# Patient Record
Sex: MALE | Race: BLACK OR AFRICAN AMERICAN
[De-identification: names, ages, dates, MRNs, and addresses within clinical notes are randomized per-mention and may not be internally consistent; named-entity substitution may affect disease eponyms.]

---

## 2018-05-07 ENCOUNTER — HOSPITAL ENCOUNTER (EMERGENCY)
Dept: HOSPITAL 89 - ER | Age: 33
Discharge: HOME | End: 2018-05-07
Payer: SELF-PAY

## 2018-05-07 VITALS — SYSTOLIC BLOOD PRESSURE: 151 MMHG | DIASTOLIC BLOOD PRESSURE: 91 MMHG

## 2018-05-07 DIAGNOSIS — F10.920: Primary | ICD-10-CM

## 2018-05-07 DIAGNOSIS — I10: ICD-10-CM

## 2018-05-07 DIAGNOSIS — E87.6: ICD-10-CM

## 2018-05-07 LAB — PLATELET COUNT, AUTOMATED: 177 K/UL (ref 150–450)

## 2018-05-07 PROCEDURE — 84295 ASSAY OF SERUM SODIUM: CPT

## 2018-05-07 PROCEDURE — 84460 ALANINE AMINO (ALT) (SGPT): CPT

## 2018-05-07 PROCEDURE — 82565 ASSAY OF CREATININE: CPT

## 2018-05-07 PROCEDURE — 83690 ASSAY OF LIPASE: CPT

## 2018-05-07 PROCEDURE — 82040 ASSAY OF SERUM ALBUMIN: CPT

## 2018-05-07 PROCEDURE — 82374 ASSAY BLOOD CARBON DIOXIDE: CPT

## 2018-05-07 PROCEDURE — 99283 EMERGENCY DEPT VISIT LOW MDM: CPT

## 2018-05-07 PROCEDURE — 36415 COLL VENOUS BLD VENIPUNCTURE: CPT

## 2018-05-07 PROCEDURE — 81001 URINALYSIS AUTO W/SCOPE: CPT

## 2018-05-07 PROCEDURE — 80320 DRUG SCREEN QUANTALCOHOLS: CPT

## 2018-05-07 PROCEDURE — 82150 ASSAY OF AMYLASE: CPT

## 2018-05-07 PROCEDURE — 82435 ASSAY OF BLOOD CHLORIDE: CPT

## 2018-05-07 PROCEDURE — 84450 TRANSFERASE (AST) (SGOT): CPT

## 2018-05-07 PROCEDURE — 80305 DRUG TEST PRSMV DIR OPT OBS: CPT

## 2018-05-07 PROCEDURE — 84443 ASSAY THYROID STIM HORMONE: CPT

## 2018-05-07 PROCEDURE — 82247 BILIRUBIN TOTAL: CPT

## 2018-05-07 PROCEDURE — 84132 ASSAY OF SERUM POTASSIUM: CPT

## 2018-05-07 PROCEDURE — 82310 ASSAY OF CALCIUM: CPT

## 2018-05-07 PROCEDURE — 83735 ASSAY OF MAGNESIUM: CPT

## 2018-05-07 PROCEDURE — 82947 ASSAY GLUCOSE BLOOD QUANT: CPT

## 2018-05-07 PROCEDURE — 84075 ASSAY ALKALINE PHOSPHATASE: CPT

## 2018-05-07 PROCEDURE — 84520 ASSAY OF UREA NITROGEN: CPT

## 2018-05-07 PROCEDURE — 80329 ANALGESICS NON-OPIOID 1 OR 2: CPT

## 2018-05-07 PROCEDURE — 84155 ASSAY OF PROTEIN SERUM: CPT

## 2018-05-07 PROCEDURE — 85025 COMPLETE CBC W/AUTO DIFF WBC: CPT

## 2018-05-07 NOTE — ER REPORT
History and Physical


Time Seen By MD:  19:21


Hx. of Stated Complaint:  


"I HAVE A ALCOHOL PROBLEM AND FEEL LIKE MY LIVER IS GOING TO DIE" "I NEED HELP"


HPI/ROS


CHIEF COMPLAINT: Alcohol detox





HISTORY OF PRESENT ILLNESS: 32-year-old male patient presents to emergency room 

with complaint of wanting to detox from alcohol. Patient states he drinks 

significantly every day and has for the past 1617 years. Patient states he 

typically will drink to 40s of beer as well as 12 shots of whiskey. Patient 

denies having any nausea, vomiting with this. Patient states that he has seen a 

primary care provider who did check labs. He was told by them that his liver is 

already damaged by this and he needs to quit drinking before his liver is 

permanently damage. Patient states that he has been having some abdominal pain, 

especially in the right upper and left upper walk since. Patient states that he 

was prescribed a medication for hypertension but has not taken it.





REVIEW OF SYSTEMS:


Respiratory: No cough, no dyspnea.


Cardiovascular: No chest pain, no palpitations.


Gastrointestinal: As noted above


Musculoskeletal: No back pain.


Allergies:  


Coded Allergies:  


     ibuprofen (Verified  Allergy, Intermediate, HIVES, 10/5/16)


     hydrocodone (Verified  Adverse Reaction, Unknown, NAUSEA/VOMITING, 10/5/16)


     tramadol (Verified  Adverse Reaction, Unknown, NAUSEA/VOMITING, 10/5/16)


Home Meds


Discontinued Scripts


Oxycodone Hcl/Acetaminophen (OXYCODONE-ACETAMINOPHEN 5-325) 1 Each Tablet, 1-2 

TAB PO Q6H Y for PAIN, #60 TAB 0 Refills


   Prov:[facility], [facility]         10/28/16


Cyclobenzaprine Hcl (CYCLOBENZAPRINE HCL) 10 Mg Tablet, 10 MG PO TID for Muscle 

Relaxant, #30 TAB


   Prov:GARY NEWMAN DNP, FNP-BC         10/28/16


Gabapentin (GABAPENTIN) 600 Mg Tablet, 1 TAB PO BID, #60 TAB 1 Refill


   Prov:GARY NEWMAN DNP [facility]         10/28/16


Past Medical/Surgical History


Patient has a past medical history of hypertension, back pain, marijuana and 

exceeded abuse in the past, alcohol use.


Patient has no pertinent surgical history.


Reviewed Nurses Notes:  Yes


Hx Smoking:  Yes


Smoking Status:  Current: Every Day Smoker


Exposure to Second Hand Smoke?:  No


Hx Substance Use Disorder:  Yes (MARIJUANNA, AND ECSTASY IN THE PAST)


Hx Alcohol Use:  Yes (FREQUENTLY, ALMOST EVERY DAY)


Constitutional





Vital Sign - Last 24 Hours








 5/7/18





 19:14


 


Temp 98.4


 


Pulse 90


 


Resp 14


 


B/P (MAP) 160/107


 


Pulse Ox 95


 


O2 Delivery Room Air








Physical Exam


  General Appearance: The patient is alert, has no immediate need for airway 

protection and no current signs of toxicity.


ENT: Tympanic membranes are pearly-gray, auditory canals are patent, mucous 

membranes are moist.


Respiratory: Chest is non tender, lungs are clear to auscultation.


Cardiac: regular rate and rhythm


Gastrointestinal: Abdomen is soft and mildly tender in the bilateral upper 

quadrants, no masses, bowel sounds normal.


Musculoskeletal:  Neck: Neck is supple and non tender.


   Extremities have full range of motion and are non tender.


Skin: No rashes or lesions.





DIFFERENTIAL DIAGNOSIS: After history and physical exam differential diagnosis 

was considered for alcohol abuse, depression, intoxication.





Medical Decision Making


Data Points


Result Diagram:  


5/7/18 1920 5/7/18 1920





Laboratory





Hematology








Test


  5/7/18


19:20 5/7/18


19:35


 


Red Blood Count


  5.44 M/uL


(4.00-5.60) 


 


 


Mean Corpuscular Volume


  88.6 fL


(80.0-96.0) 


 


 


Mean Corpuscular Hemoglobin


  30.4 pg


(26.0-33.0) 


 


 


Mean Corpuscular Hemoglobin


Concent 34.3 g/dL


(32.0-36.0) 


 


 


Red Cell Distribution Width


  13.9 %


(11.5-14.5) 


 


 


Mean Platelet Volume


  8.2 fL


(7.2-11.1) 


 


 


Neutrophils (%) (Auto)


  48.2 %


(39.4-72.5) 


 


 


Lymphocytes (%) (Auto)


  35.1 %


(17.6-49.6) 


 


 


Monocytes (%) (Auto)


  14.5 %


(4.1-12.4) 


 


 


Eosinophils (%) (Auto)


  1.4 %


(0.4-6.7) 


 


 


Basophils (%) (Auto)


  0.8 %


(0.3-1.4) 


 


 


Nucleated RBC Relative Count


(auto) 0.1 /100WBC 


  


 


 


Neutrophils # (Auto)


  3.3 K/uL


(2.0-7.4) 


 


 


Lymphocytes # (Auto)


  2.4 K/uL


(1.3-3.6) 


 


 


Monocytes # (Auto)


  1.0 K/uL


(0.3-1.0) 


 


 


Eosinophils # (Auto)


  0.1 K/uL


(0.0-0.5) 


 


 


Basophils # (Auto)


  0.1 K/uL


(0.0-0.1) 


 


 


Nucleated RBC Absolute Count


(auto) 0.00 K/uL 


  


 


 


Sodium Level


  143 mmol/L


(137-145) 


 


 


Potassium Level


  2.9 mmol/L


(3.5-5.0) 


 


 


Chloride Level


  106 mmol/L


() 


 


 


Carbon Dioxide Level


  21 mmol/L


(22-30) 


 


 


Blood Urea Nitrogen 6 mg/dl (9-21)  


 


Creatinine


  0.80 mg/dl


(0.66-1.25) 


 


 


Glomerular Filtration Rate


Calc > 60.0 


  


 


 


Random Glucose


  94 mg/dl


() 


 


 


Calcium Level


  9.5 mg/dl


(8.4-10.2) 


 


 


Magnesium Level


  1.8 mg/dl


(1.7-2.2) 


 


 


Total Bilirubin


  0.6 mg/dl


(0.2-1.3) 


 


 


Aspartate Amino Transf


(AST/SGOT) 169 U/L (0-35) 


  


 


 


Alanine Aminotransferase


(ALT/SGPT) 107 U/L (0-56) 


  


 


 


Alkaline Phosphatase 86 U/L (0-126)  


 


Total Protein


  7.4 gm/dl


(6.3-8.2) 


 


 


Albumin


  4.1 g/dl


(3.5-5.0) 


 


 


Amylase Level 95 U/L (0-110)  


 


Lipase


  298 U/L


() 


 


 


Salicylates Level < 10 mg/L  


 


Salicylate Last Dose Date unk  


 


Acetaminophen Level < 10 ug/ml  


 


Serum Alcohol 254 mg/dl  


 


Urine Color  Straw 


 


Urine Clarity  Clear 


 


Urine pH


  


  6.0 pH


(4.8-9.5)


 


Urine Specific Gravity  1.004 


 


Urine Protein


  


  Negative mg/dL


(NEGATIVE)


 


Urine Glucose (UA)


  


  Negative mg/dL


(NEGATIVE)


 


Urine Ketones


  


  Negative mg/dL


(NEGATIVE)


 


Urine Blood


  


  Negative


(NEGATIVE)


 


Urine Nitrite


  


  Negative


(NEGATIVE)


 


Urine Bilirubin


  


  Negative


(NEGATIVE)


 


Urine Urobilinogen


  


  4.0 mg/dL


(0.2-1.9)


 


Urine Leukocyte Esterase


  


  Negative


(NEGATIVE)


 


Urine RBC


  


  <1 /HPF


(0-2/HPF)


 


Urine WBC


  


  None /HPF


(0-5/HPF)


 


Urine Squamous Epithelial


Cells 


  None /LPF


(</=FEW)


 


Urine Bacteria


  


  Negative /HPF


(NONE-FEW)


 


Urine Mucus


  


  None /HPF


(NONE-FEW)


 


Urine Opiates Screen  Negative 


 


Urine Barbiturates Screen  Negative 


 


Ur Tricyclic Antidepressants


Screen 


  Negative 


 


 


Urine Phencyclidine Screen  Negative 


 


Urine Amphetamines Screen  Negative 


 


Urine Benzodiazepines Screen  Negative 


 


Urine Cocaine Screen  Negative 


 


Urine Cannabinoids Screen  Negative 








Chemistry








Test


  5/7/18


19:20 5/7/18


19:35


 


White Blood Count


  6.9 k/uL


(4.5-11.0) 


 


 


Red Blood Count


  5.44 M/uL


(4.00-5.60) 


 


 


Hemoglobin


  16.6 g/dL


(14.0-18.0) 


 


 


Hematocrit


  48.2 %


(42.0-52.0) 


 


 


Mean Corpuscular Volume


  88.6 fL


(80.0-96.0) 


 


 


Mean Corpuscular Hemoglobin


  30.4 pg


(26.0-33.0) 


 


 


Mean Corpuscular Hemoglobin


Concent 34.3 g/dL


(32.0-36.0) 


 


 


Red Cell Distribution Width


  13.9 %


(11.5-14.5) 


 


 


Platelet Count


  177 K/uL


(150-450) 


 


 


Mean Platelet Volume


  8.2 fL


(7.2-11.1) 


 


 


Neutrophils (%) (Auto)


  48.2 %


(39.4-72.5) 


 


 


Lymphocytes (%) (Auto)


  35.1 %


(17.6-49.6) 


 


 


Monocytes (%) (Auto)


  14.5 %


(4.1-12.4) 


 


 


Eosinophils (%) (Auto)


  1.4 %


(0.4-6.7) 


 


 


Basophils (%) (Auto)


  0.8 %


(0.3-1.4) 


 


 


Nucleated RBC Relative Count


(auto) 0.1 /100WBC 


  


 


 


Neutrophils # (Auto)


  3.3 K/uL


(2.0-7.4) 


 


 


Lymphocytes # (Auto)


  2.4 K/uL


(1.3-3.6) 


 


 


Monocytes # (Auto)


  1.0 K/uL


(0.3-1.0) 


 


 


Eosinophils # (Auto)


  0.1 K/uL


(0.0-0.5) 


 


 


Basophils # (Auto)


  0.1 K/uL


(0.0-0.1) 


 


 


Nucleated RBC Absolute Count


(auto) 0.00 K/uL 


  


 


 


Glomerular Filtration Rate


Calc > 60.0 


  


 


 


Calcium Level


  9.5 mg/dl


(8.4-10.2) 


 


 


Magnesium Level


  1.8 mg/dl


(1.7-2.2) 


 


 


Total Bilirubin


  0.6 mg/dl


(0.2-1.3) 


 


 


Aspartate Amino Transf


(AST/SGOT) 169 U/L (0-35) 


  


 


 


Alanine Aminotransferase


(ALT/SGPT) 107 U/L (0-56) 


  


 


 


Alkaline Phosphatase 86 U/L (0-126)  


 


Total Protein


  7.4 gm/dl


(6.3-8.2) 


 


 


Albumin


  4.1 g/dl


(3.5-5.0) 


 


 


Amylase Level 95 U/L (0-110)  


 


Lipase


  298 U/L


() 


 


 


Salicylates Level < 10 mg/L  


 


Salicylate Last Dose Date unk  


 


Acetaminophen Level < 10 ug/ml  


 


Serum Alcohol 254 mg/dl  


 


Urine Color  Straw 


 


Urine Clarity  Clear 


 


Urine pH


  


  6.0 pH


(4.8-9.5)


 


Urine Specific Gravity  1.004 


 


Urine Protein


  


  Negative mg/dL


(NEGATIVE)


 


Urine Glucose (UA)


  


  Negative mg/dL


(NEGATIVE)


 


Urine Ketones


  


  Negative mg/dL


(NEGATIVE)


 


Urine Blood


  


  Negative


(NEGATIVE)


 


Urine Nitrite


  


  Negative


(NEGATIVE)


 


Urine Bilirubin


  


  Negative


(NEGATIVE)


 


Urine Urobilinogen


  


  4.0 mg/dL


(0.2-1.9)


 


Urine Leukocyte Esterase


  


  Negative


(NEGATIVE)


 


Urine RBC


  


  <1 /HPF


(0-2/HPF)


 


Urine WBC


  


  None /HPF


(0-5/HPF)


 


Urine Squamous Epithelial


Cells 


  None /LPF


(</=FEW)


 


Urine Bacteria


  


  Negative /HPF


(NONE-FEW)


 


Urine Mucus


  


  None /HPF


(NONE-FEW)


 


Urine Opiates Screen  Negative 


 


Urine Barbiturates Screen  Negative 


 


Ur Tricyclic Antidepressants


Screen 


  Negative 


 


 


Urine Phencyclidine Screen  Negative 


 


Urine Amphetamines Screen  Negative 


 


Urine Benzodiazepines Screen  Negative 


 


Urine Cocaine Screen  Negative 


 


Urine Cannabinoids Screen  Negative 








Toxicology








Test


  5/7/18


19:20 5/7/18


19:35


 


Salicylates Level < 10 mg/L  


 


Salicylate Last Dose Date unk  


 


Acetaminophen Level < 10 ug/ml  


 


Serum Alcohol 254 mg/dl  


 


Urine Opiates Screen  Negative 


 


Urine Barbiturates Screen  Negative 


 


Ur Tricyclic Antidepressants


Screen 


  Negative 


 


 


Urine Phencyclidine Screen  Negative 


 


Urine Amphetamines Screen  Negative 


 


Urine Benzodiazepines Screen  Negative 


 


Urine Cocaine Screen  Negative 


 


Urine Cannabinoids Screen  Negative 








Urinalysis








Test


  5/7/18


19:35


 


Urine Color Straw 


 


Urine Clarity Clear 


 


Urine pH


  6.0 pH


(4.8-9.5)


 


Urine Specific Gravity 1.004 


 


Urine Protein


  Negative mg/dL


(NEGATIVE)


 


Urine Glucose (UA)


  Negative mg/dL


(NEGATIVE)


 


Urine Ketones


  Negative mg/dL


(NEGATIVE)


 


Urine Blood


  Negative


(NEGATIVE)


 


Urine Nitrite


  Negative


(NEGATIVE)


 


Urine Bilirubin


  Negative


(NEGATIVE)


 


Urine Urobilinogen


  4.0 mg/dL


(0.2-1.9)


 


Urine Leukocyte Esterase


  Negative


(NEGATIVE)


 


Urine RBC


  <1 /HPF


(0-2/HPF)


 


Urine WBC


  None /HPF


(0-5/HPF)


 


Urine Squamous Epithelial


Cells None /LPF


(</=FEW)


 


Urine Bacteria


  Negative /HPF


(NONE-FEW)


 


Urine Mucus


  None /HPF


(NONE-FEW)











ED Course/Re-evaluation


ED Course


Patient was admitted exam room, history and physical were obtained. 

Differential diagnoses were considered. On examination patient is intoxicated, 

does have slightly slurred speech, does smell of alcohol. Patient initially 

states that he would like to be admitted to behavioral health. The lab work for 

a behavioral [facility] admission was ordered. Results came back he did have a low 

potassium of 2.9. He received a potassium 20 mEq tablet here in the emergency 

room. Patient did discuss with the psych tech from the behavioral health unit. 

He was bucking at signing him. He states that he was not ready to commit to 

being put into a locked unit. He states it is very similar to when he had his 

DUI. He states he is struggling with the breakup with his significant other. He 

is unsure. I did give him some additional time to think about whether he wanted 

to be admitted one to go home. He ultimately decided that he would like to go 

home. We'll go ahead and treat him with potassium for his low potassium. I 

would like him follow-up with his primary care provider the next 3-5 days for 

further evaluation and repeat that test. Discussed this with the patient who 

verbalized understanding and agreement with plan.


Decision to Disposition Date:  May 7, 2018


Decision to Disposition Time:  20:58





Depart


Departure


Latest Vital Signs





Vital Signs








  Date Time  Temp Pulse Resp B/P (MAP) Pulse Ox O2 Delivery O2 Flow Rate FiO2


 


5/7/18 19:14 98.4 90 14 160/107 95 Room Air  








Impression:  


 Primary Impression:  


 Alcohol intoxication


 Additional Impression:  


 Hypokalemia


Condition:  Condition Unchanged


Disposition:  HOME OR SELF-CARE


Referrals:  


GARY NEWMAN DNP, FNP-BC (PCP)


New Scripts


Potassium Chloride (KLOR-CON M20) 20 Meq Tab.er.prt


20 MEQ PO QDAY, #30 TAB.SR.[id number]


   Prov: RACHEL EDWARD         5/7/18


Patient Instructions:  Alcohol Intoxication (ED)





Additional Instructions:  


Continue with normal diet.


Avoid alcohol.


Return to the ER if condition worsens.


Take medication as prescribed.


Follow up with your primary care provider in the next 1-2 weeks.


Get plenty of rest.


You may follow up with [facility] for assistance with alcohol withdrawal, ie 

counseling.





Problem Qualifiers








 Primary Impression:  


 Alcohol intoxication


 Complication of substance-induced condition:  uncomplicated  Qualified Codes:  

F10.920 - Alcohol use, unspecified with intoxication, uncomplicated








RACHEL EDWARD May 7, 2018 19:21

## 2018-06-27 ENCOUNTER — HOSPITAL ENCOUNTER (EMERGENCY)
Dept: HOSPITAL 89 - ER | Age: 33
Discharge: HOME | End: 2018-06-27
Payer: SELF-PAY

## 2018-06-27 VITALS — SYSTOLIC BLOOD PRESSURE: 146 MMHG | DIASTOLIC BLOOD PRESSURE: 122 MMHG

## 2018-06-27 DIAGNOSIS — I10: ICD-10-CM

## 2018-06-27 DIAGNOSIS — F41.9: ICD-10-CM
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
## 2018-06-27 DIAGNOSIS — F10.129: Primary | ICD-10-CM

## 2018-06-27 LAB — PLATELET COUNT, AUTOMATED: 189 K/UL (ref 150–450)

## 2018-06-27 PROCEDURE — 85379 FIBRIN DEGRADATION QUANT: CPT

## 2018-06-27 PROCEDURE — 84155 ASSAY OF PROTEIN SERUM: CPT

## 2018-06-27 PROCEDURE — 82565 ASSAY OF CREATININE: CPT

## 2018-06-27 PROCEDURE — 84132 ASSAY OF SERUM POTASSIUM: CPT

## 2018-06-27 PROCEDURE — 71046 X-RAY EXAM CHEST 2 VIEWS: CPT

## 2018-06-27 PROCEDURE — 84484 ASSAY OF TROPONIN QUANT: CPT

## 2018-06-27 PROCEDURE — 80320 DRUG SCREEN QUANTALCOHOLS: CPT

## 2018-06-27 PROCEDURE — 82040 ASSAY OF SERUM ALBUMIN: CPT

## 2018-06-27 PROCEDURE — 93005 ELECTROCARDIOGRAM TRACING: CPT

## 2018-06-27 PROCEDURE — 84295 ASSAY OF SERUM SODIUM: CPT

## 2018-06-27 PROCEDURE — 99284 EMERGENCY DEPT VISIT MOD MDM: CPT

## 2018-06-27 PROCEDURE — 82247 BILIRUBIN TOTAL: CPT

## 2018-06-27 PROCEDURE — 82310 ASSAY OF CALCIUM: CPT

## 2018-06-27 PROCEDURE — 85025 COMPLETE CBC W/AUTO DIFF WBC: CPT

## 2018-06-27 PROCEDURE — 84450 TRANSFERASE (AST) (SGOT): CPT

## 2018-06-27 PROCEDURE — 96360 HYDRATION IV INFUSION INIT: CPT

## 2018-06-27 PROCEDURE — 84460 ALANINE AMINO (ALT) (SGPT): CPT

## 2018-06-27 PROCEDURE — 82435 ASSAY OF BLOOD CHLORIDE: CPT

## 2018-06-27 PROCEDURE — 82374 ASSAY BLOOD CARBON DIOXIDE: CPT

## 2018-06-27 PROCEDURE — 71275 CT ANGIOGRAPHY CHEST: CPT

## 2018-06-27 PROCEDURE — 84075 ASSAY ALKALINE PHOSPHATASE: CPT

## 2018-06-27 PROCEDURE — 82947 ASSAY GLUCOSE BLOOD QUANT: CPT

## 2018-06-27 PROCEDURE — 84520 ASSAY OF UREA NITROGEN: CPT

## 2018-06-27 NOTE — RADIOLOGY IMAGING REPORT
FACILITY: [facility] 

 

PATIENT NAME: Jayant Farmer

: 1985

MR: [id number]

V: [id number]

EXAM DATE: 

ORDERING PHYSICIAN: KALIE NAGY

TECHNOLOGIST: 

 

Location: [facility]

Patient: Jayant Farmer

: 1985

MRN: [id number]

Visit/Account:[id number]

Date of Sevice:  2018

 

ACCESSION #: [id number]

 

EXAMINATION:   CTA of the chest with IV contrast

 

HISTORY:  Started new blood pressure medication this morning. "Doesn't feel right." Evaluate for pulm
onary embolism.

 

TECHNIQUE:   Pulmonary embolus protocol - Thin axial CT images of the chest were obtained with IV con
trast during maximal pulmonary arterial opacification. Reconstruction of the source data includes mul
tiplanar 2D coronal and sagittal reconstructed images, and 3D coronal and sagittal MIP images. Repres
entative images have been stored on PACS.

One of the following dose optimization techniques was utilized in the performance of this exam: Autom
ated exposure control; adjustment of the mA and/or kV according to the patient's size; or use of an i
terative  reconstruction technique.  Specific details can be referenced in the facility's radiology C
T exam operational policy.

Contrast:   75 mL of IV Isovue-370.

 

COMPARISON:   None.

 

FINDINGS:

Pulmonary arteries:  The pulmonary arteries are well opacified, without suspicious filling defect.

Heart, aorta, and great vessels:  Normal caliber thoracic aorta. Normal heart size. No pericardial ef
fusion.

Lungs and pleura:  The lungs are clear. No focal consolidation. No pleural effusion or pneumothorax. 
The central airways are patent.

Mediastinum and jones:  Negative.

 

Visualized upper abdomen:  Fatty infiltration of the liver.

Chest wall:  Negative.

Bones:  Negative.

 

IMPRESSION:

1. No evidence of pulmonary embolism.

2. No other acute findings in the chest. The lungs are clear.

3. Diffuse hepatic steatosis.

 

Report Dictated By: Ishmael Owen MD at 2018 7:42 PM

 

Report E-Signed By: Ishmael Owen MD  at 2018 7:46 PM

 

WSN:[id number]

## 2018-06-27 NOTE — ER REPORT
History and Physical


Time Seen By MD:  17:23


HPI/ROS


CHIEF COMPLAINT: Shortness of breath





HISTORY OF PRESENT ILLNESS: This is a 32-year-old male presents to the 

emergency department for shortness of breath. Patient states that would last 

several days he's had an increase in shortness breath more so today since about 

9:00 this morning. Patient also states over the last 2-3 days he's had some 

increased chest pain bandlike across to his anterior chest. Patient states he 

has high anxiety, has a lot of life stressors right now and thinks this could 

be related to his anxiety but is unsure. Patient was sent from the [facility] today. Patient also states that he started a new blood pressure 

medication today and is unsure what the medication is. Patient also states that 

he has had issues with drinking L call in the past, the last time he had 

anything to drink was about 3 days ago. No vomiting however he does have nausea 

intermittently. No dysuria or diarrhea or blood in the stools. No fevers or 

chills. No headaches. No rashes.





REVIEW OF SYSTEMS:


Constitutional: No fever, no chills.


Eyes: No discharge.


ENT: No sore throat. 


Cardiovascular: As above.


Respiratory: As above.


Gastrointestinal: No abdominal pain, no vomiting.


Genitourinary: No hematuria.


Musculoskeletal: No back pain.


Skin: No rashes.


Neurological: No headache.


Allergies:  


Coded Allergies:  


     ibuprofen (Verified  Allergy, Intermediate, HIVES, 18)


     hydrocodone (Verified  Adverse Reaction, Unknown, NAUSEA/VOMITING, 18)


     tramadol (Verified  Adverse Reaction, Unknown, NAUSEA/VOMITING, 18)


Home Meds


Reported Medications


[Bp Med]   No Conflict Check


   18


Discontinued Scripts


Potassium Chloride (KLOR-CON M20) 20 Meq Tab.er.prt, 20 MEQ PO QDAY, #30 

TAB.SR.24H


   Prov:RACHEL EDWARD         18


Past Medical/Surgical History


Patient has a past medical and surgical history of hypertension, back pain, 

polysubstance abuse, alcohol abuse, anxiety,


Reviewed Nurses Notes:  Yes


Hx Smoking:  Yes


Smoking Status:  Current: Every Day Smoker


Exposure to Second Hand Smoke?:  No


Hx Substance Use Disorder:  Yes (MARIJUANNA, AND ECSTASY IN THE PAST)


Hx Alcohol Use:  Yes (FREQUENTLY, ALMOST EVERY DAY)


Constitutional





Vital Sign - Last 24 Hours








 18





 17:20 17:23 17:26 17:30


 


Temp  98.8  


 


Pulse  78  


 


Resp  20  


 


B/P (MAP) 147/94 (111) 147/94 149/94 (112) 147/93 (111)


 


Pulse Ox  94  


 


O2 Delivery  Room Air  


 


    





 18





 17:44 18:00 18:14 18:19


 


Pulse 59  55 ???


 


Resp 11  7 16


 


B/P (MAP)  123/82 (96)  


 


Pulse Ox 97  98 99





 18





 18:30 18:49 19:00 19:04


 


Pulse  64  ???


 


Resp  15  21


 


B/P (MAP) 135/118 (124)  143/105 (118) 


 


Pulse Ox  97  97





 18





 19:19 19:30 19:34 19:49


 


Pulse ???  62 75


 


Resp   9 18


 


B/P (MAP)  157/116 (130)  


 


Pulse Ox   96 97





 18  





 20:00 20:04  


 


Pulse  65  


 


Resp  16  


 


B/P (MAP) 146/122 (130)   


 


Pulse Ox  97  








Physical Exam


   General Appearance: The patient is alert, has no immediate need for airway 

protection and no signs of toxicity.


Eyes: Pupils equal and round no pallor or injection.


ENT, Mouth: Mucous membranes are dry.


Respiratory: There are no retractions, lungs are clear to auscultation.


Cardiovascular: Regular rate and rhythm, no murmurs, clicks or rubs.


Gastrointestinal:  Abdomen is soft and non tender, no masses, bowel sounds 

normal.


Neurological: Alert and oriented 4. Moving all extremities. Following all 

commands. No focal neuro deficits.


Skin: Warm and dry, no rashes.


Musculoskeletal:  Neck is supple non tender.


      Extremities are nontender, nonswollen and have full range of motion.





DIFFERENTIAL DIAGNOSIS: After history and physical exam differential diagnosis 

was considered for shortness of breath including but not limited to pulmonary 

infectious process, COPD, asthma, pulmonary embolus and congestive heart 

failure.





Medical Decision Making


Data Points


Result Diagram:  


18 [id number] 175





Laboratory





Hematology








Test


  18


17:50


 


Red Blood Count


  5.46 M/uL


(4.00-5.60)


 


Mean Corpuscular Volume


  89.3 fL


(80.0-96.0)


 


Mean Corpuscular Hemoglobin


  31.1 pg


(26.0-33.0)


 


Mean Corpuscular Hemoglobin


Concent 34.9 g/dL


(32.0-36.0)


 


Red Cell Distribution Width


  14.0 %


(11.5-14.5)


 


Mean Platelet Volume


  8.1 fL


(7.2-11.1)


 


Neutrophils (%) (Auto)


  29.2 %


(39.4-72.5)


 


Lymphocytes (%) (Auto)


  51.9 %


(17.6-49.6)


 


Monocytes (%) (Auto)


  16.0 %


(4.1-12.4)


 


Eosinophils (%) (Auto)


  1.4 %


(0.4-6.7)


 


Basophils (%) (Auto)


  1.5 %


(0.3-1.4)


 


Nucleated RBC Relative Count


(auto) 0.0 /100WBC 


 


 


Neutrophils # (Auto)


  1.2 K/uL


(2.0-7.4)


 


Lymphocytes # (Auto)


  2.1 K/uL


(1.3-3.6)


 


Monocytes # (Auto)


  0.7 K/uL


(0.3-1.0)


 


Eosinophils # (Auto)


  0.1 K/uL


(0.0-0.5)


 


Basophils # (Auto)


  0.1 K/uL


(0.0-0.1)


 


Nucleated RBC Absolute Count


(auto) 0.00 K/uL 


 


 


D-Dimer Quantitative (PE/DVT)


  0.99 ug/ml


(0-0.50)


 


Sodium Level


  146 mmol/L


(137-145)


 


Potassium Level


  3.1 mmol/L


(3.5-5.0)


 


Chloride Level


  109 mmol/L


()


 


Carbon Dioxide Level


  24 mmol/L


(22-30)


 


Blood Urea Nitrogen 6 mg/dl (9-21) 


 


Creatinine


  0.70 mg/dl


(0.66-1.25)


 


Glomerular Filtration Rate


Calc > 60.0 


 


 


Random Glucose


  98 mg/dl


()


 


Calcium Level


  8.8 mg/dl


(8.4-10.2)


 


Total Bilirubin


  0.7 mg/dl


(0.2-1.3)


 


Aspartate Amino Transf


(AST/SGOT) 215 U/L (0-35) 


 


 


Alanine Aminotransferase


(ALT/SGPT) 118 U/L (0-56) 


 


 


Alkaline Phosphatase


  110 U/L


(0-126)


 


Troponin I < 0.012 ng/ml 


 


Total Protein


  7.4 g/dl


(6.3-8.2)


 


Albumin


  4.1 g/dl


(3.5-5.0)


 


Serum Alcohol 289 mg/dl 








Chemistry








Test


  18


17:50


 


White Blood Count


  4.1 k/uL


(4.5-11.0)


 


Red Blood Count


  5.46 M/uL


(4.00-5.60)


 


Hemoglobin


  17.0 g/dL


(14.0-18.0)


 


Hematocrit


  48.7 %


(42.0-52.0)


 


Mean Corpuscular Volume


  89.3 fL


(80.0-96.0)


 


Mean Corpuscular Hemoglobin


  31.1 pg


(26.0-33.0)


 


Mean Corpuscular Hemoglobin


Concent 34.9 g/dL


(32.0-36.0)


 


Red Cell Distribution Width


  14.0 %


(11.5-14.5)


 


Platelet Count


  189 K/uL


(150-450)


 


Mean Platelet Volume


  8.1 fL


(7.2-11.1)


 


Neutrophils (%) (Auto)


  29.2 %


(39.4-72.5)


 


Lymphocytes (%) (Auto)


  51.9 %


(17.6-49.6)


 


Monocytes (%) (Auto)


  16.0 %


(4.1-12.4)


 


Eosinophils (%) (Auto)


  1.4 %


(0.4-6.7)


 


Basophils (%) (Auto)


  1.5 %


(0.3-1.4)


 


Nucleated RBC Relative Count


(auto) 0.0 /100WBC 


 


 


Neutrophils # (Auto)


  1.2 K/uL


(2.0-7.4)


 


Lymphocytes # (Auto)


  2.1 K/uL


(1.3-3.6)


 


Monocytes # (Auto)


  0.7 K/uL


(0.3-1.0)


 


Eosinophils # (Auto)


  0.1 K/uL


(0.0-0.5)


 


Basophils # (Auto)


  0.1 K/uL


(0.0-0.1)


 


Nucleated RBC Absolute Count


(auto) 0.00 K/uL 


 


 


D-Dimer Quantitative (PE/DVT)


  0.99 ug/ml


(0-0.50)


 


Glomerular Filtration Rate


Calc > 60.0 


 


 


Calcium Level


  8.8 mg/dl


(8.4-10.2)


 


Total Bilirubin


  0.7 mg/dl


(0.2-1.3)


 


Aspartate Amino Transf


(AST/SGOT) 215 U/L (0-35) 


 


 


Alanine Aminotransferase


(ALT/SGPT) 118 U/L (0-56) 


 


 


Alkaline Phosphatase


  110 U/L


(0-126)


 


Troponin I < 0.012 ng/ml 


 


Total Protein


  7.4 g/dl


(6.3-8.2)


 


Albumin


  4.1 g/dl


(3.5-5.0)


 


Serum Alcohol 289 mg/dl 








Coagulation








Test


  18


17:50


 


D-Dimer Quantitative (PE/DVT) 0.99 ug/ml 








Toxicology








Test


  18


17:50


 


Serum Alcohol 289 mg/dl 











EKG/Imaging


EKG Interpretation


12 lead EKG: Time of EKG 1751.


      Rhythm: Normal sinus rhythm, ventricular rate 66 bpm.


      Axis: normal 


      QRS: normal


      ST segments: Probable early repolarization, with J-point elevation in V2 

V3. No ST elevation or depression identified.


Imaging


Location: [facility]


Patient: Jayant Farmer


: 1985


MRN: [id number]


Visit/Account:[id number]


Date of Sevice:  2018


 


ACCESSION #: [id number]


 


Chest 2 views:


 


HISTORY: Started new blood pressure meds this morning patient states "doesn't 

feel right"


 


COMPARISON: None.


 


FINDINGS: Frontal and lateral chest: Cardiomediastinal silhouette is within 

normal limits. There is no infiltrate or pleural effusion. No pneumothorax. 

Pulmonary vasculature is normal.


 


Osseous structures are unremarkable.


 


IMPRESSION: No evidence of acute cardiopulmonary abnormality.


 


Report Dictated By: Luba Andrew MD at 2018 7:12 PM


 


Report E-Signed By: Luba Andrew MD  at 2018 7:14 PM


 


WSN:[id number]





EXAMINATION:   CTA of the chest with IV contrast


 


HISTORY:  Started new blood pressure medication this morning. "Doesn't feel 

right." Evaluate for pulmonary embolism.


 


TECHNIQUE:   Pulmonary embolus protocol - Thin axial CT images of the chest 

were obtained with IV contrast during maximal pulmonary arterial opacification. 

Reconstruction of the source data includes multiplanar 2D coronal and sagittal 

reconstructed images, and 3D coronal and sagittal MIP images. Representative 

images have been stored on PACS.


One of the following dose optimization techniques was utilized in the 

performance of this exam: Automated exposure control; adjustment of the mA and/

or kV according to the patient's size; or use of an iterative  reconstruction 

technique.  Specific details can be referenced in the facility's radiology CT 

exam operational policy.


Contrast:   75 mL of IV Isovue-370.


 


COMPARISON:   None.


 


FINDINGS:


Pulmonary arteries:  The pulmonary arteries are well opacified, without 

suspicious filling defect.


Heart, aorta, and great vessels:  Normal caliber thoracic aorta. Normal heart 

size. No pericardial effusion.


Lungs and pleura:  The lungs are clear. No focal consolidation. No pleural 

effusion or pneumothorax. The central airways are patent.


Mediastinum and jones:  Negative.


 


Visualized upper abdomen:  Fatty infiltration of the liver.


Chest wall:  Negative.


Bones:  Negative.


 


IMPRESSION:


1. No evidence of pulmonary embolism.


2. No other acute findings in the chest. The lungs are clear.


3. Diffuse hepatic steatosis.


 


Report Dictated By: Ishmael Owen MD at 2018 7:42 PM


 


Report E-Signed By: Ishmael Owen MD  at 2018 7:46 PM


 


WSN:[id number]





ED Course/Re-evaluation


Clinical Indication for ER IV:  Hydration, IV Access


ED Course


The patient was admitted to a room. A history physical were obtained. 

Differential diagnoses were considered. An IV was started. A CBC, CMP, d-dimer 

and troponin were obtained. CBC showing wbc's 4.1, chemistry showing sodium 146

, potassium 3.1 AST 2:15,  d-dimer 0.99 serum alcohol 289. Patient did 

tell me that his last alcohol intake was 2-3 days ago. Two-view chest x-ray was 

negative for any acute cardiopulmonary processes. CTA of the chest was negative 

for pulmonary embolus. I did review these results with the patient. I did also 

tell the patient that his serum alcohol was elevated, he did tell me that he 

did do some sugars today, he said "I'm sorry I didn't tell you the truth earlier

". I did tell patient that the chest pain that he is experiencing today could 

be secondary to his anxiety as well as the blood pressure medications as body 

adjusting to these, I also instructed patient to follow up with his 

psychiatrist as scheduled tomorrow as well as his counselor on Friday. I 

instructed the patient to try to cut back on his alcohol consumption. Patient 

expressed understanding, had no other questions or concerns at this time and 

was discharged home.





 2018 6:46:44 pm I did review the lab studies with the patient, in 

particular the elevated d-dimer at syrup 0.99. I did tell the patient that with 

the elevation and the d-dimer as well as his shortness of breath go ahead and 

proceed with a CT of the chest looking for a clot. Patient is in agreement with 

this plan of care.


Patient is also sitting up in bed, playing chess with a friend, laughing, does 

not appear to be in any distress.


Decision to Disposition Date:  2018


Decision to Disposition Time:  19:59





Depart


Departure


Latest Vital Signs





Vital Signs








  Date Time  Temp Pulse Resp B/P (MAP) Pulse Ox O2 Delivery O2 Flow Rate FiO2


 


18 20:04  65 16  97   


 


18 20:00    146/122 (130)    


 


18 17:23 98.8     Room Air  








Impression:  


 Primary Impression:  


 Alcohol abuse


 Additional Impressions:  


 Anxiety


 Hypertension


Condition:  Improved


Disposition:  HOME OR SELF-CARE


Patient Instructions:  Abuse of Alcohol (ED), Anxiety (ED), Hypertension (ED)





Additional Instructions:  


Drink plenty of water, especially if your working outside.


Get plenty of rest.


Continue taking the blood pressure medications as prescribed.


Follow-up with your psychologist tomorrow as scheduled.


Continue trying to cut back on your alcohol use.


Follow-up with your counseling as scheduled on Friday.


Return to the emergency department for any other concerns or worsening symptoms.





Problem Qualifiers








 Additional Impressions:  


 Hypertension


 Hypertension type:  unspecified  Qualified Codes:  I10 - Essential (primary) 

hypertension








KALIE NAGY FNP-BC 2018 17:24

## 2018-06-27 NOTE — RADIOLOGY IMAGING REPORT
FACILITY: [facility] 

 

PATIENT NAME: Jayant Farmer

: 1985

MR: [id number]

V: [id number]

EXAM DATE: 

ORDERING PHYSICIAN: KALIE NAGY

TECHNOLOGIST: 

 

Location: [facility]

Patient: Jayant Farmer

: 1985

MRN: [id number]

Visit/Account:[id number]

Date of Sevice:  2018

 

ACCESSION #: [id number]

 

Chest 2 views:

 

HISTORY: Started new blood pressure meds this morning patient states "doesn't feel right"

 

COMPARISON: None.

 

FINDINGS: Frontal and lateral chest: Cardiomediastinal silhouette is within normal limits. There is n
o infiltrate or pleural effusion. No pneumothorax. Pulmonary vasculature is normal.

 

Osseous structures are unremarkable.

 

IMPRESSION: No evidence of acute cardiopulmonary abnormality.

 

Report Dictated By: Luba Andrew MD at 2018 7:12 PM

 

Report E-Signed By: Luba Andrew MD  at 2018 7:14 PM

 

WSN:[id number]

## 2018-06-27 NOTE — EKG
FACILITY: [facility] 

 

PATIENT NAME: ELADIO YODER

: [id number]

MR: [id number]

V: [id number]

EXAM DATE: 

ORDERING PHYSICIAN: KALIE NAGY

TECHNOLOGIST: BITNER

 

Test Reason : SOB

Blood Pressure : ***/*** mmHG

Vent. Rate : 066 BPM     Atrial Rate : 066 BPM

   P-R Int : 174 ms          QRS Dur : 080 ms

    QT Int : 410 ms       P-R-T Axes : 080 054 068 degrees

   QTc Int : 429 ms

 

Normal sinus rhythm

Septal infarct , age undetermined vs lead placement

No ST-T abnormalities

No previous ECGs available

Confirmed by ARABELLA BRADLEY ([id number]) on 2018 6:59:39 PM

 

Referred By:             Confirmed By:ARABELLA BRADLEY

## 2018-08-13 ENCOUNTER — HOSPITAL ENCOUNTER (EMERGENCY)
Dept: HOSPITAL 89 - ER | Age: 33
Discharge: LEFT BEFORE DISCHARGE | End: 2018-08-13
Payer: SELF-PAY

## 2018-08-13 ENCOUNTER — HOSPITAL ENCOUNTER (OUTPATIENT)
Dept: HOSPITAL 89 - AMB | Age: 33
End: 2018-08-13
Payer: SELF-PAY

## 2018-08-13 VITALS — DIASTOLIC BLOOD PRESSURE: 113 MMHG | SYSTOLIC BLOOD PRESSURE: 163 MMHG

## 2018-08-13 DIAGNOSIS — Z72.89: ICD-10-CM

## 2018-08-13 DIAGNOSIS — F10.239: Primary | ICD-10-CM

## 2018-08-13 DIAGNOSIS — R00.0: ICD-10-CM

## 2018-08-13 DIAGNOSIS — R53.81: ICD-10-CM

## 2018-08-13 DIAGNOSIS — R56.9: Primary | ICD-10-CM

## 2018-08-13 DIAGNOSIS — R11.0: ICD-10-CM

## 2018-08-13 LAB — PLATELET COUNT, AUTOMATED: 155 K/UL (ref 150–450)

## 2018-08-13 PROCEDURE — 84443 ASSAY THYROID STIM HORMONE: CPT

## 2018-08-13 PROCEDURE — 83735 ASSAY OF MAGNESIUM: CPT

## 2018-08-13 PROCEDURE — 82374 ASSAY BLOOD CARBON DIOXIDE: CPT

## 2018-08-13 PROCEDURE — 84155 ASSAY OF PROTEIN SERUM: CPT

## 2018-08-13 PROCEDURE — 84132 ASSAY OF SERUM POTASSIUM: CPT

## 2018-08-13 PROCEDURE — 82040 ASSAY OF SERUM ALBUMIN: CPT

## 2018-08-13 PROCEDURE — 96375 TX/PRO/DX INJ NEW DRUG ADDON: CPT

## 2018-08-13 PROCEDURE — 84520 ASSAY OF UREA NITROGEN: CPT

## 2018-08-13 PROCEDURE — 81001 URINALYSIS AUTO W/SCOPE: CPT

## 2018-08-13 PROCEDURE — 82310 ASSAY OF CALCIUM: CPT

## 2018-08-13 PROCEDURE — 80305 DRUG TEST PRSMV DIR OPT OBS: CPT

## 2018-08-13 PROCEDURE — 82435 ASSAY OF BLOOD CHLORIDE: CPT

## 2018-08-13 PROCEDURE — 82565 ASSAY OF CREATININE: CPT

## 2018-08-13 PROCEDURE — 84100 ASSAY OF PHOSPHORUS: CPT

## 2018-08-13 PROCEDURE — 82247 BILIRUBIN TOTAL: CPT

## 2018-08-13 PROCEDURE — 84450 TRANSFERASE (AST) (SGOT): CPT

## 2018-08-13 PROCEDURE — 70450 CT HEAD/BRAIN W/O DYE: CPT

## 2018-08-13 PROCEDURE — 84295 ASSAY OF SERUM SODIUM: CPT

## 2018-08-13 PROCEDURE — 36415 COLL VENOUS BLD VENIPUNCTURE: CPT

## 2018-08-13 PROCEDURE — 84075 ASSAY ALKALINE PHOSPHATASE: CPT

## 2018-08-13 PROCEDURE — 99284 EMERGENCY DEPT VISIT MOD MDM: CPT

## 2018-08-13 PROCEDURE — 80320 DRUG SCREEN QUANTALCOHOLS: CPT

## 2018-08-13 PROCEDURE — 82947 ASSAY GLUCOSE BLOOD QUANT: CPT

## 2018-08-13 PROCEDURE — 85025 COMPLETE CBC W/AUTO DIFF WBC: CPT

## 2018-08-13 PROCEDURE — 96365 THER/PROPH/DIAG IV INF INIT: CPT

## 2018-08-13 PROCEDURE — 84460 ALANINE AMINO (ALT) (SGPT): CPT

## 2018-08-13 NOTE — ER REPORT
History and Physical


Time Seen By MD:  16:30


Hx. of Stated Complaint:  


WITNESSED SEIZURE WHILE PLAYING VIDEO GAMES.  ROOMMATE REPORTS IT LASTED APPROX 


45 SECONDS


HPI/ROS


CHIEF COMPLAINT: seizure





HISTORY OF PRESENT ILLNESS: Patient is a daily drinker usual intake is a 40-oz 

and a couple shots who has been trying to cut back on his drinking, 2 days ago 

admits to going through withdrawal symptoms that included headache, nausea, 

generally not feeling well; yesterday had some whiskey and beer to begin 

feeling better, awoke this morning again not feeling well so had a couple shots 

and ate some pizza. This afternoon he was playing video games when his roommate 

noticed that he had a generalized tonic-clonic seizure activity that lasted 

approximately one minute. The patient does not recall this event but recalls 

awakening with 2 EMTs sit next to him on the couch. Patient now complains of 

nausea as he vomited multiple times after the seizure and general malaise. 

Patient notes that he must have bitten tongue. And complains of calf pain and 

tightness. He also complains of mild headache. Patient is trying to quit 

because he knows that he uses alcohol to deal with issues in his life and is 

trying to improve how he feels. He denies suicidal ideations. He has never had 

seizure activity previously. He is not currently taking any medications  does 

smoke tobacco and denies drug use.





REVIEW OF SYSTEMS:


Constitutional: No fever, no chills.


Eyes: No discharge.


ENT: No sore throat. 


Cardiovascular: No chest pain, no palpitations.


Respiratory: No cough, no shortness of breath.


Gastrointestinal: above


Genitourinary: No hematuria.


Musculoskeletal: No back pain; calf pain as above; no shoulder pain


Skin: No rashes.


Neurological: mild ha


Remainder of the 14 system rev:  Yes


Allergies:  


Coded Allergies:  


     ibuprofen (Verified  Allergy, Intermediate, HIVES, 6/27/18)


     hydrocodone (Verified  Adverse Reaction, Unknown, NAUSEA/VOMITING, 6/27/18)


     tramadol (Verified  Adverse Reaction, Unknown, NAUSEA/VOMITING, 6/27/18)


Home Meds


Discontinued Reported Medications


[Bp Med]   No Conflict Check


   6/27/18


Reviewed Nurses Notes:  Yes


Old Medical Records Reviewed:  Yes


Hx Smoking:  Yes


Smoking Status:  Current: Every Day Smoker


Exposure to Second Hand Smoke?:  No


Hx Substance Use Disorder:  No (MARIJUANNA, AND ECSTASY IN THE PAST)


Hx Alcohol Use:  Yes (Q4D 40 OZ AND 2 SHOTS)


Constitutional





Vital Sign - Last 24 Hours








 8/13/18 8/13/18 8/13/18 8/13/18





 16:20 16:30 17:00 17:30


 


Temp 98.5   


 


Pulse 108 101 90 85


 


Resp 20 19 16 13


 


B/P (MAP) 165/108 164/104 (124) 157/102 (120) 148/101 (117)


 


Pulse Ox 94 96  98


 


O2 Delivery Room Air   








Physical Exam


   General Appearance: [The patient is alert, has no immediate need for airway 

protection and no signs of toxicity.] Pt appears mildly uncomfortable


 Pupils equal and round no pallor or injection. No nystagmus


ENT, Mouth: Mucous membranes are moist. Tongue contusions bilaterally from 

tongue biting


Respiratory: There are no retractions, lungs are clear to auscultation.


Cardiovascular: tachycardic to 110, rr, no m/r/g


Gastrointestinal:  Abdomen is soft; mild general tendeness, no masses, bowel 

sounds normal.


Neurological: alert, oriented x 3, though appears sleepy; cn intact, cerebellar 

nl


Skin: Warm and dry, no rashes. no sgs injury


Musculoskeletal:  Neck is supple non tender.


      Extremities; mild calf ttp bilaterally; nonswollen and have full range of 

motion.





DIFFERENTIAL DIAGNOSIS: After history and physical exam differential diagnosis 

was considered for a seizure including but not limited to electrolyte 

abnormality, alcohol withdrawal, medication noncompliance, head injury, and 

breakthrough seizure.





Medical Decision Making


Data Points


Result Diagram:  


8/13/18 1656                                                                   

             8/13/18 1656





Laboratory





Hematology








Test


  8/13/18


16:56 8/13/18


17:06


 


Red Blood Count


  5.54 M/uL


(4.00-5.60) 


 


 


Mean Corpuscular Volume


  89.8 fL


(80.0-96.0) 


 


 


Mean Corpuscular Hemoglobin


  31.2 pg


(26.0-33.0) 


 


 


Mean Corpuscular Hemoglobin


Concent 34.7 g/dL


(32.0-36.0) 


 


 


Red Cell Distribution Width


  13.7 %


(11.5-14.5) 


 


 


Mean Platelet Volume


  8.6 fL


(7.2-11.1) 


 


 


Neutrophils (%) (Auto)


  75.7 %


(39.4-72.5) 


 


 


Lymphocytes (%) (Auto)


  12.8 %


(17.6-49.6) 


 


 


Monocytes (%) (Auto)


  10.9 %


(4.1-12.4) 


 


 


Eosinophils (%) (Auto)


  0.2 %


(0.4-6.7) 


 


 


Basophils (%) (Auto)


  0.4 %


(0.3-1.4) 


 


 


Nucleated RBC Relative Count


(auto) 0.0 /100WBC 


  


 


 


Neutrophils # (Auto)


  3.4 K/uL


(2.0-7.4) 


 


 


Lymphocytes # (Auto)


  0.6 K/uL


(1.3-3.6) 


 


 


Monocytes # (Auto)


  0.5 K/uL


(0.3-1.0) 


 


 


Eosinophils # (Auto)


  0.0 K/uL


(0.0-0.5) 


 


 


Basophils # (Auto)


  0.0 K/uL


(0.0-0.1) 


 


 


Nucleated RBC Absolute Count


(auto) 0.00 K/uL 


  


 


 


Peripheral Blood Smear Yes Y/N  


 


Sodium Level


  134 mmol/L


(137-145) 


 


 


Potassium Level


  3.1 mmol/L


(3.5-5.0) 


 


 


Chloride Level


  98 mmol/L


() 


 


 


Carbon Dioxide Level


  23 mmol/L


(22-30) 


 


 


Blood Urea Nitrogen 5 mg/dl (9-21)  


 


Creatinine


  0.70 mg/dl


(0.66-1.25) 


 


 


Glomerular Filtration Rate


Calc > 60.0 


  


 


 


Random Glucose


  135 mg/dl


() 


 


 


Calcium Level


  9.5 mg/dl


(8.4-10.2) 


 


 


Phosphorus Level


  2.3 mg/dl


(2.5-4.5) 


 


 


Magnesium Level


  1.5 mg/dl


(1.7-2.2) 


 


 


Total Bilirubin


  1.4 mg/dl


(0.2-1.3) 


 


 


Aspartate Amino Transf


(AST/SGOT) 146 U/L (0-35) 


  


 


 


Alanine Aminotransferase


(ALT/SGPT) 105 U/L (0-56) 


  


 


 


Alkaline Phosphatase


  103 U/L


(0-126) 


 


 


Total Protein


  7.5 g/dl


(6.3-8.2) 


 


 


Albumin


  4.3 g/dl


(3.5-5.0) 


 


 


Serum Alcohol < 10 mg/dl  


 


Urine Color  Yellow 


 


Urine Clarity  Clear 


 


Urine pH


  


  8.0 pH


(4.8-9.5)


 


Urine Specific Gravity  1.012 


 


Urine Protein


  


  30 mg/dL


(NEGATIVE)


 


Urine Glucose (UA)


  


  Negative mg/dL


(NEGATIVE)


 


Urine Ketones


  


  Trace mg/dL


(NEGATIVE)


 


Urine Blood


  


  Small


(NEGATIVE)


 


Urine Nitrite


  


  Negative


(NEGATIVE)


 


Urine Bilirubin


  


  Negative


(NEGATIVE)


 


Urine Urobilinogen


  


  Negative mg/dL


(0.2-1.9)


 


Urine Leukocyte Esterase


  


  Negative


(NEGATIVE)


 


Urine RBC


  


  1 /HPF


(0-2/HPF)


 


Urine WBC


  


  2 /HPF


(0-5/HPF)


 


Urine Squamous Epithelial


Cells 


  Few /LPF


(</=FEW)


 


Urine Bacteria


  


  Few /HPF


(NONE-FEW)


 


Urine Hyaline Casts


  


  Many /LPF


(NONE-FEW)


 


Urine Mucus


  


  Few /HPF


(NONE-FEW)


 


Urine Opiates Screen  Negative 


 


Urine Barbiturates Screen  Negative 


 


Ur Tricyclic Antidepressants


Screen 


  Negative 


 


 


Urine Phencyclidine Screen  Negative 


 


Urine Amphetamines Screen  Negative 


 


Urine Benzodiazepines Screen  Negative 


 


Urine Cocaine Screen  Negative 


 


Urine Cannabinoids Screen  Negative 








Chemistry








Test


  8/13/18


16:56 8/13/18


17:06


 


White Blood Count


  4.5 k/uL


(4.5-11.0) 


 


 


Red Blood Count


  5.54 M/uL


(4.00-5.60) 


 


 


Hemoglobin


  17.3 g/dL


(14.0-18.0) 


 


 


Hematocrit


  49.8 %


(42.0-52.0) 


 


 


Mean Corpuscular Volume


  89.8 fL


(80.0-96.0) 


 


 


Mean Corpuscular Hemoglobin


  31.2 pg


(26.0-33.0) 


 


 


Mean Corpuscular Hemoglobin


Concent 34.7 g/dL


(32.0-36.0) 


 


 


Red Cell Distribution Width


  13.7 %


(11.5-14.5) 


 


 


Platelet Count


  155 K/uL


(150-450) 


 


 


Mean Platelet Volume


  8.6 fL


(7.2-11.1) 


 


 


Neutrophils (%) (Auto)


  75.7 %


(39.4-72.5) 


 


 


Lymphocytes (%) (Auto)


  12.8 %


(17.6-49.6) 


 


 


Monocytes (%) (Auto)


  10.9 %


(4.1-12.4) 


 


 


Eosinophils (%) (Auto)


  0.2 %


(0.4-6.7) 


 


 


Basophils (%) (Auto)


  0.4 %


(0.3-1.4) 


 


 


Nucleated RBC Relative Count


(auto) 0.0 /100WBC 


  


 


 


Neutrophils # (Auto)


  3.4 K/uL


(2.0-7.4) 


 


 


Lymphocytes # (Auto)


  0.6 K/uL


(1.3-3.6) 


 


 


Monocytes # (Auto)


  0.5 K/uL


(0.3-1.0) 


 


 


Eosinophils # (Auto)


  0.0 K/uL


(0.0-0.5) 


 


 


Basophils # (Auto)


  0.0 K/uL


(0.0-0.1) 


 


 


Nucleated RBC Absolute Count


(auto) 0.00 K/uL 


  


 


 


Peripheral Blood Smear Yes Y/N  


 


Glomerular Filtration Rate


Calc > 60.0 


  


 


 


Calcium Level


  9.5 mg/dl


(8.4-10.2) 


 


 


Phosphorus Level


  2.3 mg/dl


(2.5-4.5) 


 


 


Magnesium Level


  1.5 mg/dl


(1.7-2.2) 


 


 


Total Bilirubin


  1.4 mg/dl


(0.2-1.3) 


 


 


Aspartate Amino Transf


(AST/SGOT) 146 U/L (0-35) 


  


 


 


Alanine Aminotransferase


(ALT/SGPT) 105 U/L (0-56) 


  


 


 


Alkaline Phosphatase


  103 U/L


(0-126) 


 


 


Total Protein


  7.5 g/dl


(6.3-8.2) 


 


 


Albumin


  4.3 g/dl


(3.5-5.0) 


 


 


Serum Alcohol < 10 mg/dl  


 


Urine Color  Yellow 


 


Urine Clarity  Clear 


 


Urine pH


  


  8.0 pH


(4.8-9.5)


 


Urine Specific Gravity  1.012 


 


Urine Protein


  


  30 mg/dL


(NEGATIVE)


 


Urine Glucose (UA)


  


  Negative mg/dL


(NEGATIVE)


 


Urine Ketones


  


  Trace mg/dL


(NEGATIVE)


 


Urine Blood


  


  Small


(NEGATIVE)


 


Urine Nitrite


  


  Negative


(NEGATIVE)


 


Urine Bilirubin


  


  Negative


(NEGATIVE)


 


Urine Urobilinogen


  


  Negative mg/dL


(0.2-1.9)


 


Urine Leukocyte Esterase


  


  Negative


(NEGATIVE)


 


Urine RBC


  


  1 /HPF


(0-2/HPF)


 


Urine WBC


  


  2 /HPF


(0-5/HPF)


 


Urine Squamous Epithelial


Cells 


  Few /LPF


(</=FEW)


 


Urine Bacteria


  


  Few /HPF


(NONE-FEW)


 


Urine Hyaline Casts


  


  Many /LPF


(NONE-FEW)


 


Urine Mucus


  


  Few /HPF


(NONE-FEW)


 


Urine Opiates Screen  Negative 


 


Urine Barbiturates Screen  Negative 


 


Ur Tricyclic Antidepressants


Screen 


  Negative 


 


 


Urine Phencyclidine Screen  Negative 


 


Urine Amphetamines Screen  Negative 


 


Urine Benzodiazepines Screen  Negative 


 


Urine Cocaine Screen  Negative 


 


Urine Cannabinoids Screen  Negative 








Toxicology








Test


  8/13/18


16:56 8/13/18


17:06


 


Serum Alcohol < 10 mg/dl  


 


Urine Opiates Screen  Negative 


 


Urine Barbiturates Screen  Negative 


 


Ur Tricyclic Antidepressants


Screen 


  Negative 


 


 


Urine Phencyclidine Screen  Negative 


 


Urine Amphetamines Screen  Negative 


 


Urine Benzodiazepines Screen  Negative 


 


Urine Cocaine Screen  Negative 


 


Urine Cannabinoids Screen  Negative 








Urinalysis








Test


  8/13/18


17:06


 


Urine Color Yellow 


 


Urine Clarity Clear 


 


Urine pH


  8.0 pH


(4.8-9.5)


 


Urine Specific Gravity 1.012 


 


Urine Protein


  30 mg/dL


(NEGATIVE)


 


Urine Glucose (UA)


  Negative mg/dL


(NEGATIVE)


 


Urine Ketones


  Trace mg/dL


(NEGATIVE)


 


Urine Blood


  Small


(NEGATIVE)


 


Urine Nitrite


  Negative


(NEGATIVE)


 


Urine Bilirubin


  Negative


(NEGATIVE)


 


Urine Urobilinogen


  Negative mg/dL


(0.2-1.9)


 


Urine Leukocyte Esterase


  Negative


(NEGATIVE)


 


Urine RBC


  1 /HPF


(0-2/HPF)


 


Urine WBC


  2 /HPF


(0-5/HPF)


 


Urine Squamous Epithelial


Cells Few /LPF


(</=FEW)


 


Urine Bacteria


  Few /HPF


(NONE-FEW)


 


Urine Hyaline Casts


  Many /LPF


(NONE-FEW)


 


Urine Mucus


  Few /HPF


(NONE-FEW)











ED Course/Re-evaluation


ED Course


Pt presents after witnessed seizure by roomate; initially postictal, now 

demonstrating withdrawal symptoms; initially tachycardic, mild headache, 

nausea. ED eval demonstrates tongue biting, but no open lac, no other 

significant injuries. Pt improved with ativan and HD stable. Will admit for 

monitoring, r/o other etiologies of sz, repletion of electrolytes


Decision to Disposition Date:  Aug 13, 2018


Decision to Disposition Time:  17:54





Depart


Departure


Latest Vital Signs





Vital Signs








  Date Time  Temp Pulse Resp B/P (MAP) Pulse Ox O2 Delivery O2 Flow Rate FiO2


 


8/13/18 17:30  85 13 148/101 (117) 98   


 


8/13/18 16:20 98.5     Room Air  








Impression:  


 Primary Impression:  


 Seizure


 Additional Impression:  


 Alcohol withdrawal


Condition:  Improved


Disposition:  Admitted from ER


New Scripts


Unable to Obtain Active Prescriptions or Reported Meds





Problem Qualifiers








 Additional Impression:  


 Alcohol withdrawal


 Complication of substance-induced condition:  with unspecified complication  

Qualified Codes:  F10.239 - Alcohol dependence with withdrawal, unspecified








SOSA BOYLE MD Aug 13, 2018 16:55

## 2018-08-13 NOTE — RADIOLOGY IMAGING REPORT
FACILITY: [facility] 

 

PATIENT NAME: Jayant Farmer

: 1985

MR: [id number]

V: [id number]

EXAM DATE: 

ORDERING PHYSICIAN: SOSA BOYLE

TECHNOLOGIST: 

 

Location: [facility]

Patient: Jayant Farmer

: 1985

MRN: [id number]

Visit/Account:[id number]

Date of Sevice:  2018

 

ACCESSION #: [id number]

 

EXAMINATION: CT head without IV contrast

 

HISTORY: Seizure.

 

TECHNIQUE:   Axial CT images of the head were obtained from the vertex to the skull base without IV c
ontrast, with coronal and sagittal 2D reconstructed images.

One of the following dose optimization techniques was utilized in the performance of this exam: Autom
ated exposure control; adjustment of the mA and/or kV according to the patient's size; or use of an i
terative  reconstruction technique.  Specific details can be referenced in the facility's radiology C
T exam operational policy.

 

COMPARISON:   None.

 

FINDINGS:

The intracranial contents are unremarkable.  No CT evidence of intracranial hemorrhage, mass lesion, 
or acute infarct.  No midline shift or extra-axial fluid collections.  Gray-white differentiation is 
maintained.

 

The calvarium is intact.  The visualized paranasal sinuses and mastoid air cells are unopacified.

 

IMPRESSION: Unremarkable noncontrast head CT.

 

Report Dictated By: Ishmael Owen MD at 2018 5:54 PM

 

Report E-Signed By: Ishmael Owen MD  at 2018 6:02 PM

 

WSN:[id number]

## 2018-10-18 ENCOUNTER — HOSPITAL ENCOUNTER (INPATIENT)
Dept: HOSPITAL 89 - BHS | Age: 33
LOS: 3 days | Discharge: HOME | DRG: 897 | End: 2018-10-21
Attending: PSYCHIATRY & NEUROLOGY | Admitting: PSYCHIATRY & NEUROLOGY
Payer: SELF-PAY

## 2018-10-18 ENCOUNTER — HOSPITAL ENCOUNTER (EMERGENCY)
Dept: HOSPITAL 89 - ER | Age: 33
Discharge: PSYCHIATRIC OR SUBSTANCE USE FACILITY | End: 2018-10-18
Payer: SELF-PAY

## 2018-10-18 VITALS — SYSTOLIC BLOOD PRESSURE: 158 MMHG | DIASTOLIC BLOOD PRESSURE: 90 MMHG

## 2018-10-18 VITALS — SYSTOLIC BLOOD PRESSURE: 140 MMHG | DIASTOLIC BLOOD PRESSURE: 100 MMHG

## 2018-10-18 VITALS — HEIGHT: 75 IN | WEIGHT: 185 LBS | BODY MASS INDEX: 23 KG/M2

## 2018-10-18 VITALS — DIASTOLIC BLOOD PRESSURE: 94 MMHG | SYSTOLIC BLOOD PRESSURE: 146 MMHG

## 2018-10-18 DIAGNOSIS — Z23: ICD-10-CM

## 2018-10-18 DIAGNOSIS — Z73.3: ICD-10-CM

## 2018-10-18 DIAGNOSIS — F10.230: Primary | ICD-10-CM

## 2018-10-18 DIAGNOSIS — I10: ICD-10-CM

## 2018-10-18 DIAGNOSIS — F17.210: ICD-10-CM

## 2018-10-18 DIAGNOSIS — Z88.8: ICD-10-CM
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
## 2018-10-18 DIAGNOSIS — Z88.5: ICD-10-CM

## 2018-10-18 LAB — PLATELET COUNT, AUTOMATED: 186 K/UL (ref 150–450)

## 2018-10-18 PROCEDURE — 82310 ASSAY OF CALCIUM: CPT

## 2018-10-18 PROCEDURE — 82374 ASSAY BLOOD CARBON DIOXIDE: CPT

## 2018-10-18 PROCEDURE — 36415 COLL VENOUS BLD VENIPUNCTURE: CPT

## 2018-10-18 PROCEDURE — 84460 ALANINE AMINO (ALT) (SGPT): CPT

## 2018-10-18 PROCEDURE — 82040 ASSAY OF SERUM ALBUMIN: CPT

## 2018-10-18 PROCEDURE — 84450 TRANSFERASE (AST) (SGOT): CPT

## 2018-10-18 PROCEDURE — 84075 ASSAY ALKALINE PHOSPHATASE: CPT

## 2018-10-18 PROCEDURE — 84295 ASSAY OF SERUM SODIUM: CPT

## 2018-10-18 PROCEDURE — 84132 ASSAY OF SERUM POTASSIUM: CPT

## 2018-10-18 PROCEDURE — 82247 BILIRUBIN TOTAL: CPT

## 2018-10-18 PROCEDURE — 82947 ASSAY GLUCOSE BLOOD QUANT: CPT

## 2018-10-18 PROCEDURE — 82435 ASSAY OF BLOOD CHLORIDE: CPT

## 2018-10-18 PROCEDURE — 84443 ASSAY THYROID STIM HORMONE: CPT

## 2018-10-18 PROCEDURE — 90674 CCIIV4 VAC NO PRSV 0.5 ML IM: CPT

## 2018-10-18 PROCEDURE — 80305 DRUG TEST PRSMV DIR OPT OBS: CPT

## 2018-10-18 PROCEDURE — 99284 EMERGENCY DEPT VISIT MOD MDM: CPT

## 2018-10-18 PROCEDURE — 84520 ASSAY OF UREA NITROGEN: CPT

## 2018-10-18 PROCEDURE — 83735 ASSAY OF MAGNESIUM: CPT

## 2018-10-18 PROCEDURE — 84155 ASSAY OF PROTEIN SERUM: CPT

## 2018-10-18 PROCEDURE — 80320 DRUG SCREEN QUANTALCOHOLS: CPT

## 2018-10-18 PROCEDURE — 85025 COMPLETE CBC W/AUTO DIFF WBC: CPT

## 2018-10-18 PROCEDURE — 80329 ANALGESICS NON-OPIOID 1 OR 2: CPT

## 2018-10-18 PROCEDURE — 82565 ASSAY OF CREATININE: CPT

## 2018-10-18 PROCEDURE — 81001 URINALYSIS AUTO W/SCOPE: CPT

## 2018-10-18 PROCEDURE — 90471 IMMUNIZATION ADMIN: CPT

## 2018-10-18 RX ADMIN — DIAZEPAM PRN MG: 10 TABLET ORAL at 21:24

## 2018-10-18 NOTE — ER REPORT
History and Physical


Time Seen By MD:  15:45


Allergies:  


Coded Allergies:  


     ibuprofen (Verified  Allergy, Intermediate, HIVES, 6/27/18)


     hydrocodone (Verified  Adverse Reaction, Unknown, NAUSEA/VOMITING, 6/27/18)


     tramadol (Verified  Adverse Reaction, Unknown, NAUSEA/VOMITING, 6/27/18)


Home Meds


No Active Prescriptions or Reported Meds


Hx Smoking:  Yes


Smoking Status:  Current: Every Day Smoker


Exposure to Second Hand Smoke?:  No


Hx Substance Use Disorder:  No (MARIJUANNA, AND ECSTASY IN THE PAST)


Hx Alcohol Use:  Yes (Q4D 40 OZ AND 2 SHOTS)





Depart


Departure


Condition:  Stable


Disposition:  HOME OR SELF-CARE


New Scripts


No Active Prescriptions or Reported Meds











ANALILIA OROZCO DO            Oct 18, 2018 15:45

## 2018-10-19 VITALS — SYSTOLIC BLOOD PRESSURE: 167 MMHG | DIASTOLIC BLOOD PRESSURE: 113 MMHG

## 2018-10-19 VITALS — DIASTOLIC BLOOD PRESSURE: 95 MMHG | SYSTOLIC BLOOD PRESSURE: 137 MMHG

## 2018-10-19 VITALS — SYSTOLIC BLOOD PRESSURE: 146 MMHG | DIASTOLIC BLOOD PRESSURE: 98 MMHG

## 2018-10-19 VITALS — SYSTOLIC BLOOD PRESSURE: 166 MMHG | DIASTOLIC BLOOD PRESSURE: 100 MMHG

## 2018-10-19 VITALS — SYSTOLIC BLOOD PRESSURE: 150 MMHG | DIASTOLIC BLOOD PRESSURE: 106 MMHG

## 2018-10-19 RX ADMIN — Medication SCH MG: at 08:16

## 2018-10-19 RX ADMIN — FOLIC ACID SCH MG: 1 TABLET ORAL at 08:16

## 2018-10-19 RX ADMIN — DIAZEPAM PRN MG: 10 TABLET ORAL at 22:05

## 2018-10-19 RX ADMIN — NICOTINE PRN MG: 4 INHALANT RESPIRATORY (INHALATION) at 19:20

## 2018-10-19 RX ADMIN — DIAZEPAM PRN MG: 10 TABLET ORAL at 11:20

## 2018-10-19 NOTE — SCHAAF H&P
DATE OF ADMISSION: October 18, 2018



ATTENDING PHYSICIAN

Tuan Lima MD



Patient was seen at approximately 1100 hours on October 19, 2018 for a note 

concerning this dictation.



PRESENTING PROBLEM, CHIEF COMPLAINT

Patient presenting for alcohol withdrawal.



HISTORY OF PRESENT ILLNESS

This is a very pleasant 33-year-old male who states he wants to quit drinking 

alcohol as well as nicotine consumption now, and he is coming to the hospital 

because he "can't do it himself."  Patient reports he is deciding to quit 

alcohol use now to help his own life, and that he wants to set an example for 

his 2-year-old daughter.  Patient reports specific stressors in his life, that 

he cannot see his daughter until he gets free of alcohol use.  Patient reporting

that this is not any kind of legal requirement, but more something he is holding

himself to.  Other than that, patient denies any symptoms of psychiatric 

concern.  Patient does have some ongoing negative memories of having his mother 

pass away from lupus at an early age approximately four years ago, but this does

not seem to meet criteria for PTSD, and patient denies any other psychiatric 

symptoms of concern other than frustration with current ongoing alcohol use.



MENTAL HEALTH HISTORY

The patient has never been an inpatient in a psychiatric [facility]te before.  Patient 

reports some outpatient treatment related to alcohol use in the past, but none 

now, and patient has never been in residential treatment facility for alcohol 

use disorder.  Patient has attended AA in the past.  Patient has no suicide 

attempt history.  



MEDICATIONS

The patient is not currently on any medications now.



FAMILY PSYCHIATRIC HISTORY

One of the patient's half-brothers may have been involved with alcohol and drug 

use.  Mother was involved with alcohol and drug use during her life as well.  No

other psychiatric history in genetic relatives is known.  



PAST MEDICAL HISTORY

The patient has been diagnosed with hypertension in the past and been on 

medications for it.  This is likely related in part to nicotine and alcohol 

consumption.



ALLERGIES

HYDROCODONE, IBUPROFEN, AND TRAMADOL.



SOCIAL HISTORY

The patient was born in [place] and raised in [place] area.  It is 

unknown if his parents were together at the time of his birth.  Patient lived 

with his mother until being placed in a foster home.  He states she was unable 

to care for multitude of children.  Patient reports having four half-brothers 

and two half-sisters.  Patient did graduate from high school.  He attempted some

college in the past.  Patient has no significant other now.  He considers 

himself heterosexual.  Does have a 2-year-old daughter believed to be living 

with the child's mother.  The patient has worked for a construction company for 

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 



LEGAL HISTORY

Patient reports DUI four years ago.  No other legal history is known.



SUBSTANCE ABUSE HISTORY

Patient drinking alcohol on a daily basis for many years and is a 2-1/2 pack a 

day smoker.



PHYSICAL EXAMINATION

Please see emergency room note notable for cooperative 33-year-old male, in no 

acute medical distress.  Vital signs at the time of admission:  Temperature 

99.0, pulse 112, respiratory rate 16, blood pressure 194/118 and pulse oximetry 

95% on room air.



LABORATORY DATA

TSH 1.52, within normal limits.  CBC notable for RBC slightly elevated at 5.6.  

Chemistry panel notable for magnesium 1.6 and low, potassium 3.1 and low, AST 

133 and elevated, ALT 88 and elevated.  Urinalysis notable for urine 

urobilinogen and protein present.  Toxicology screen negative for substances of 

abuse with a serum alcohol level of 172 upon admission.



MENTAL STATUS EXAMINATION 

GENERAL APPEARANCE, BEHAVIOR AND ATTITUDE:  This is a pleasant, cooperative 

33-year-old male interacting well with this provider and other treatment team 

staff.  Patient making good eye contact.  No bizarre mannerisms are detected.  

No periods of tearfulness.  Minimal psychomotor agitation noted.  Patient 

currently being treated for alcohol withdrawal.

SPEECH:  Within normal limits.  Regular rate, rhythm, volume and tone.

MOOD:  Described as frustrated over alcohol use. 

AFFECT:  Minimally constricted at times and mood-congruent.  

THOUGHT PROCESSES:  Goal-directed and logical.  Patient verbalizing the desire 

to abstain from nicotine and alcohol upon department from the hospital.  No 

loose associations or flight of ideas.  

THOUGHT CONTENT:   Free of auditory or visual hallucinations, ideas of 

reference, thought broadcastings, delusions, obsessions, compulsions.   The 

patient adamantly denying suicidal or homicidal ideations.

SENSORIUM:  Clear.

COGNITION:  Alert and oriented to person, place, time and situation.  

MEMORY:  Immediate, recent and remote estimated intact.

INTELLIGENCE:  Average, based on interview.  

INSIGHT AND JUDGMENT:  Considered grossly intact in the absence of alcohol use.



ASSESSMENT

This is a very pleasant 33-year-old male, presents voluntarily for help of 

alcohol withdrawal.  We will treat alcohol withdrawal to completion with 

diazepam per [facility] protocol, and we will continue to evaluate any other symptoms 

of concern.



DIAGNOSES

1.  Alcohol intoxication.

2.  Alcohol withdrawal.

3.  Alcohol use disorder, severe.



PLAN

1.  Admit to the unit.

2.  Necessary precautions will be implemented.

3.  The patient will participate in individual and group therapy.

4.  Medications to be titrated accordingly, including diazepam per [facility] 

protocol.

5.  Collateral information to be obtained as necessary.

6.  Estimated length of stay of three days.

MTDD

## 2018-10-20 VITALS — SYSTOLIC BLOOD PRESSURE: 151 MMHG | DIASTOLIC BLOOD PRESSURE: 109 MMHG

## 2018-10-20 VITALS — SYSTOLIC BLOOD PRESSURE: 154 MMHG | DIASTOLIC BLOOD PRESSURE: 98 MMHG

## 2018-10-20 VITALS — SYSTOLIC BLOOD PRESSURE: 111 MMHG | DIASTOLIC BLOOD PRESSURE: 92 MMHG

## 2018-10-20 VITALS — SYSTOLIC BLOOD PRESSURE: 140 MMHG | DIASTOLIC BLOOD PRESSURE: 80 MMHG

## 2018-10-20 VITALS — SYSTOLIC BLOOD PRESSURE: 147 MMHG | DIASTOLIC BLOOD PRESSURE: 86 MMHG

## 2018-10-20 RX ADMIN — NICOTINE PRN MG: 4 INHALANT RESPIRATORY (INHALATION) at 20:03

## 2018-10-20 RX ADMIN — Medication SCH MG: at 08:33

## 2018-10-20 RX ADMIN — NICOTINE PRN MG: 4 INHALANT RESPIRATORY (INHALATION) at 13:47

## 2018-10-20 RX ADMIN — FOLIC ACID SCH MG: 1 TABLET ORAL at 08:33

## 2018-10-20 NOTE — BHS PROGRESS NOTE
BHS - Subjective


Progress Notes


Subjective


Met with Jayant this afternoon at 1315.  He is progressing well with his detox 


protocol and feeling hopeful.  He has made plans to "turn my life around" and 


intends to find a sober living situation, eliminate friends who drink, "be there


for my daughter."  Specifically, he intends to start going to AA as soon as he 


is released from the hospital.


Suicidal Ideation:  None


Homicidal Ideation:  None





BHS - Objective


Physical Exam


Gait and Station:  Steady


Allergies Reviewed:  Yes





Mental Status Exam


General Appearance:  Well Groomed, Good Eye Contact, Cooperative, Polite, Good 


Interaction


Speech:  Clear, Spontaneous, Normal Rate, Normal Rhythm, Normal Volume, Normal 


Tone


Mood:  Euthymic


Affect:  Full and Appropriate, Calm


Thought Process:  Organized, Logical, Goal Directed


Thought Content:  No Suicidal Ideation, No Homicidal Ideation, No Delusions, No 


Auditory Halllucinations, No Visual Hallucinations, No Thought Broadcasting, No 


Ideas of Reference, No Obsessions, No Compulsions, No Other


Sensorium:  Clear


Memory:  Immediate, Recent, Remote, Other


Intelligence:  Average


Insight Judgment:  Good


Result Diagram:  


10/19/18 1515








BHS Assessment and Plan


Face-to-Face Encounter Date:  Oct 20, 2018


Face-to-Face Encounter Time:  13:15


Multpiple Antipsychotics Used:  No


Problems:  


(1) Alcohol withdrawal


Status:  Acute


(2) Alcohol abuse


Status:  Acute


(3) Alcohol intoxication


Status:  Resolved


Condition


Santosh is progressing well and hope to release his tomorrow when he completes 


detox.











BOBBY CARDENAS DO               Oct 20, 2018 13:39

## 2018-10-21 VITALS — DIASTOLIC BLOOD PRESSURE: 87 MMHG | SYSTOLIC BLOOD PRESSURE: 144 MMHG

## 2018-10-21 VITALS — DIASTOLIC BLOOD PRESSURE: 100 MMHG | SYSTOLIC BLOOD PRESSURE: 163 MMHG

## 2018-10-21 RX ADMIN — Medication SCH MG: at 08:12

## 2018-10-21 RX ADMIN — FOLIC ACID SCH MG: 1 TABLET ORAL at 08:12

## 2018-10-23 NOTE — DISCHARGE SUMMARY
DATE OF ADMISSION:  October 18, 2018 

DATE OF DISCHARGE: October 21, 2018



ATTENDING PHYSICIAN

[place],  



TIME AND DATE SEEN

October 21, 2018 at 09:00 hours.  



FINAL DIAGNOSIS 

1.  Alcohol intoxication. 

2.  Alcohol withdrawal. 

3.  Alcohol use disorder. 



REASON FOR ADMISSION 

Mr. Farmer presented to the emergency room stating he wanted to quit drinking 

alcohol.  He came to the hospital because he was unable to stop drinking on his 

own and needed help with detox.  



PHYSICAL EXAMINATION 

Physical examination done in the emergency room found a generally healthy 

33-year-old male in no medical distress. 

VITAL SIGNS: At the time of admission were a Temp 99, Pulse 112, Respirations 

18, Blood Pressure elevated at 194/118.  



LABORATORY DATA 

Included a TSH of 1.52, CBC notable for slightly elevated RBC count at 5.6.  

Chemistry panel notable for a magnesium 1.6 and low potassium 3.1.   and 

elevated and ALT also elevated at 88 consistent with his history of alcohol 

consumption.  Urobilinogen and Protein were present in the urine.  Toxicology 

was negative for substances of abuse and a serum alcohol level was 172 upon 

admission.  



MENTAL STATUS EXAMINATION AT THE TIME OF DISCHARGE

GENERAL APPEARANCE, BEHAVIOR AND ATTITUDE: I met with the patient on the morning

of discharge at about 9:00 a.m.  At this time, he presents as a well developed, 

well nourished, black male who is in good spirits and anxious to be released to 

continue outpatient treatment.  

SPEECH:  Coherent and logical. 

MOOD AND AFFECT:  Positive with no signs of depression or vicki.  

THOUGHT PROCESSES:  Are entirely coherent and logical. He denies any 

hallucinations or other psychotic thought processes, thoughts about suicide or 

violence towards others.  

COGNITION:  Is alert, oriented in all spheres. 

SENSORIUM: Clear. 

MEMORY:  For immediate, recent, and long term events is also intact.  

INTELLIGENCE:  Is judged to be at least average. 

INSIGHT AND JUDGMENT:  Good with an understanding of the importance of continues

sobriety and the challenges that presents for him.  



TREATMENT 

Mr. Farmer was admitted and treated for alcohol withdrawal according to the [facility]

protocol.  We also added Temazepam for sleep.  He responded nicely to individual

and group therapy as well as detox protocol.  



HOSPITAL COURSE

In addition to detox, Mr. Farmer met with representatives from  and concrete 

plans were made for him to begin attending AA meetings and to get a sponsor.  He

also began completing assignments that they gave him in the [employer].  



CONDITION ON DISCHARGE 

Stable and improved.  Mr. Farmer considered a minimal risk to himself or others.

 



DISPOSITION

Mr. Farmer will be released and follow up with the local AA community.  He has 

information about meetings and intends to get a sponsor.  I gave Mr. Farmer a 

prescription for Hydroxyzine 50 mg, #30, which he can take on an as needed basis

for sleep following discharge.   

MTDD

## 2018-11-29 ENCOUNTER — HOSPITAL ENCOUNTER (INPATIENT)
Dept: HOSPITAL 89 - BHS | Age: 33
LOS: 2 days | Discharge: HOME | DRG: 897 | End: 2018-12-01
Attending: PSYCHIATRY & NEUROLOGY | Admitting: PSYCHIATRY & NEUROLOGY
Payer: COMMERCIAL

## 2018-11-29 ENCOUNTER — HOSPITAL ENCOUNTER (EMERGENCY)
Dept: HOSPITAL 89 - ER | Age: 33
Discharge: PSYCHIATRIC OR SUBSTANCE USE FACILITY | End: 2018-11-29
Payer: COMMERCIAL

## 2018-11-29 VITALS — SYSTOLIC BLOOD PRESSURE: 162 MMHG | DIASTOLIC BLOOD PRESSURE: 111 MMHG

## 2018-11-29 DIAGNOSIS — Z91.14: ICD-10-CM

## 2018-11-29 DIAGNOSIS — R45.851: Primary | ICD-10-CM
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
## 2018-11-29 DIAGNOSIS — Z88.8: ICD-10-CM

## 2018-11-29 DIAGNOSIS — F10.24: Primary | ICD-10-CM

## 2018-11-29 DIAGNOSIS — F12.10: ICD-10-CM

## 2018-11-29 DIAGNOSIS — F17.210: ICD-10-CM

## 2018-11-29 DIAGNOSIS — Z81.1: ICD-10-CM

## 2018-11-29 DIAGNOSIS — Z63.0: ICD-10-CM

## 2018-11-29 DIAGNOSIS — Z88.5: ICD-10-CM

## 2018-11-29 DIAGNOSIS — F10.920: ICD-10-CM

## 2018-11-29 DIAGNOSIS — Z81.8: ICD-10-CM

## 2018-11-29 DIAGNOSIS — I10: ICD-10-CM

## 2018-11-29 LAB — PLATELET COUNT, AUTOMATED: 231 K/UL (ref 150–450)

## 2018-11-29 PROCEDURE — 84460 ALANINE AMINO (ALT) (SGPT): CPT

## 2018-11-29 PROCEDURE — 81001 URINALYSIS AUTO W/SCOPE: CPT

## 2018-11-29 PROCEDURE — 80329 ANALGESICS NON-OPIOID 1 OR 2: CPT

## 2018-11-29 PROCEDURE — 82374 ASSAY BLOOD CARBON DIOXIDE: CPT

## 2018-11-29 PROCEDURE — 82247 BILIRUBIN TOTAL: CPT

## 2018-11-29 PROCEDURE — 82040 ASSAY OF SERUM ALBUMIN: CPT

## 2018-11-29 PROCEDURE — 85025 COMPLETE CBC W/AUTO DIFF WBC: CPT

## 2018-11-29 PROCEDURE — 82947 ASSAY GLUCOSE BLOOD QUANT: CPT

## 2018-11-29 PROCEDURE — 84132 ASSAY OF SERUM POTASSIUM: CPT

## 2018-11-29 PROCEDURE — 84450 TRANSFERASE (AST) (SGOT): CPT

## 2018-11-29 PROCEDURE — 82310 ASSAY OF CALCIUM: CPT

## 2018-11-29 PROCEDURE — 84155 ASSAY OF PROTEIN SERUM: CPT

## 2018-11-29 PROCEDURE — 82565 ASSAY OF CREATININE: CPT

## 2018-11-29 PROCEDURE — 99284 EMERGENCY DEPT VISIT MOD MDM: CPT

## 2018-11-29 PROCEDURE — 82435 ASSAY OF BLOOD CHLORIDE: CPT

## 2018-11-29 PROCEDURE — 83735 ASSAY OF MAGNESIUM: CPT

## 2018-11-29 PROCEDURE — 84295 ASSAY OF SERUM SODIUM: CPT

## 2018-11-29 PROCEDURE — 36415 COLL VENOUS BLD VENIPUNCTURE: CPT

## 2018-11-29 PROCEDURE — 84075 ASSAY ALKALINE PHOSPHATASE: CPT

## 2018-11-29 PROCEDURE — 80320 DRUG SCREEN QUANTALCOHOLS: CPT

## 2018-11-29 PROCEDURE — 80305 DRUG TEST PRSMV DIR OPT OBS: CPT

## 2018-11-29 PROCEDURE — 84443 ASSAY THYROID STIM HORMONE: CPT

## 2018-11-29 PROCEDURE — 84520 ASSAY OF UREA NITROGEN: CPT

## 2018-11-29 RX ADMIN — DIAZEPAM PRN MG: 10 TABLET ORAL at 23:51

## 2018-11-29 SDOH — SOCIAL STABILITY - SOCIAL INSECURITY: PROBLEMS IN RELATIONSHIP WITH SPOUSE OR PARTNER: Z63.0

## 2018-11-29 NOTE — ER REPORT
History and Physical


Time Seen By MD:  21:17


HPI/ROS


CHIEF COMPLAINT: Emergency senior care for suicidal statements





HISTORY OF PRESENT ILLNESS: This is a 33-year-old male. He is here with the 


's office, brought in under emergency senior care for suicidal statements 


made earlier. He states that he has been stressed out and was just blowing off 


steam when he told a female friend that he wanted to off himself. They, hence 


about driving down the road standing up in the open roof and causing an accident


and/or running head-on into another vehicle. The female friend called behavioral


health who then phoned law enforcement who then made contact with a female 


friend. They then made contact with the patient when he arrived at the female 


friend's home. Patient states that he is not currently suicidal. He has been 


drinking tonight. He has been here in the ER and admitted to behavioral health 


in the past for alcohol detox. Denies any other drug use. He has a little bit 


upset situation because he feels his words were taken out of context when he was


just blowing off steam.





REVIEW OF SYSTEMS:


Respiratory: No cough, no dyspnea.


Cardiovascular: No chest pain, no palpitations.


Gastrointestinal: No vomiting, no abdominal pain.


Musculoskeletal: No musculoskeletal pain.


Allergies:  


Coded Allergies:  


     ibuprofen (Verified  Allergy, Intermediate, HIVES, 10/18/18)


     hydrocodone (Verified  Adverse Reaction, Unknown, NAUSEA/VOMITING, 


10/18/18)


     tramadol (Verified  Adverse Reaction, Unknown, NAUSEA/VOMITING, 10/18/18)


Home Meds


Discontinued Reported Medications


Nicotine (NICOTROL) 10 Mg/Inh Ctr, 10 MG INH PRN PRN for NICOTINE REPLACEMENT


   10/21/18


Hydroxyzine Pamoate (HYDROXYZINE PAMOATE) 50 Mg Capsule, 50 MG PO QHS PRN for 


SLEEP, CAPSULE


   10/21/18


Reviewed Nurses Notes:  Yes


Hx Smoking:  Yes


Smoking Status:  Current: Every Day Smoker, Heavy Tobacco Smoker


Exposure to Second Hand Smoke?:  No


Hx Substance Use Disorder:  No (MARIJUANNA, AND ECSTASY IN THE PAST)


Hx Alcohol Use:  Yes


Constitutional





Vital Sign - Last 24 Hours








 11/29/18 11/29/18 11/29/18 11/29/18





 21:19 21:23 21:30 21:38


 


Temp  98.7  


 


Pulse  79  71


 


Resp  17  


 


B/P (MAP) 176/121 (139) 176/121 167/123 (138) 


 


Pulse Ox  95  94


 


O2 Delivery  Room Air  


 


    





 11/29/18 11/29/18 11/29/18 11/29/18





 21:43 21:45 22:00 22:13


 


Pulse 77   67


 


B/P (MAP)  168/134 (145) 161/121 (134) 


 


Pulse Ox 96   97





 11/29/18   





 22:15   


 


B/P (MAP) 162/111 (128)   








Physical Exam


  General Appearance: The patient is alert, has no immediate need for airway 


protection and no current signs of toxicity. 


Eyes: Pupils equal and round no injection.


ENT: Normal oral mucosa. Moist mucous membranes.


Neck: Neck is supple and non tender.


Respiratory: Chest is non tender, lungs are clear to auscultation.


Cardiac: regular rate and rhythm


Gastrointestinal: Abdomen is soft and non tender, no masses, bowel sounds 


normal.


Musculoskeletal: Extremities have full range of motion.


Skin: No rashes or lesions.





DIFFERENTIAL DIAGNOSIS: After history and physical exam differential diagnosis 


was considered for emergency senior care for suicidal ideation





Medical Decision Making


Data Points


Result Diagram:  


11/29/18 2135 11/29/18 2135





Laboratory





Hematology








Test


 11/29/18


21:17 11/29/18


21:35


 


Urine Color Yellow  


 


Urine Clarity Clear  


 


Urine pH


 6.0 pH


(4.8-9.5) 





 


Urine Specific Gravity 1.012  


 


Urine Protein


 30 mg/dL


(NEGATIVE) 





 


Urine Glucose (UA)


 Negative mg/dL


(NEGATIVE) 





 


Urine Ketones


 Negative mg/dL


(NEGATIVE) 





 


Urine Blood


 Small


(NEGATIVE) 





 


Urine Nitrite


 Negative


(NEGATIVE) 





 


Urine Bilirubin


 Negative


(NEGATIVE) 





 


Urine Urobilinogen


 Negative mg/dL


(0.2-1.9) 





 


Urine Leukocyte Esterase


 Negative


(NEGATIVE) 





 


Urine RBC


 1 /HPF


(0-2/HPF) 





 


Urine WBC


 1 /HPF


(0-5/HPF) 





 


Urine Squamous Epithelial


Cells None /LPF


(</=FEW) 





 


Urine Bacteria


 Negative /HPF


(NONE-FEW) 





 


Urine Mucus


 Few /HPF


(NONE-FEW) 





 


Urine Opiates Screen Negative  


 


Urine Barbiturates Screen Negative  


 


Ur Tricyclic Antidepressants


Screen Negative 


 





 


Urine Phencyclidine Screen Negative  


 


Urine Amphetamines Screen Negative  


 


Urine Benzodiazepines Screen Negative  


 


Urine Cocaine Screen Negative  


 


Urine Cannabinoids Screen Negative  


 


Red Blood Count


 


 5.87 M/uL


(4.00-5.60)


 


Mean Corpuscular Volume


 


 89.6 fL


(80.0-96.0)


 


Mean Corpuscular Hemoglobin


 


 30.8 pg


(26.0-33.0)


 


Mean Corpuscular Hemoglobin


Concent 


 34.4 g/dL


(32.0-36.0)


 


Red Cell Distribution Width


 


 13.8 %


(11.5-14.5)


 


Mean Platelet Volume


 


 7.6 fL


(7.2-11.1)


 


Neutrophils (%) (Auto)


 


 46.2 %


(39.4-72.5)


 


Lymphocytes (%) (Auto)


 


 39.2 %


(17.6-49.6)


 


Monocytes (%) (Auto)


 


 11.8 %


(4.1-12.4)


 


Eosinophils (%) (Auto)


 


 1.4 %


(0.4-6.7)


 


Basophils (%) (Auto)


 


 1.4 %


(0.3-1.4)


 


Nucleated RBC Relative Count


(auto) 


 0.1 /100WBC 





 


Neutrophils # (Auto)


 


 3.5 K/uL


(2.0-7.4)


 


Lymphocytes # (Auto)


 


 3.0 K/uL


(1.3-3.6)


 


Monocytes # (Auto)


 


 0.9 K/uL


(0.3-1.0)


 


Eosinophils # (Auto)


 


 0.1 K/uL


(0.0-0.5)


 


Basophils # (Auto)


 


 0.1 K/uL


(0.0-0.1)


 


Nucleated RBC Absolute Count


(auto) 


 0.00 K/uL 





 


Sodium Level


 


 143 mmol/L


(137-145)


 


Potassium Level


 


 3.7 mmol/L


(3.5-5.0)


 


Chloride Level


 


 105 mmol/L


()


 


Carbon Dioxide Level


 


 29 mmol/L


(22-30)


 


Blood Urea Nitrogen  9 mg/dl (9-21) 


 


Creatinine


 


 0.80 mg/dl


(0.66-1.25)


 


Glomerular Filtration Rate


Calc 


 > 60.0 





 


Random Glucose


 


 97 mg/dl


()


 


Calcium Level


 


 8.8 mg/dl


(8.4-10.2)


 


Magnesium Level


 


 1.8 mg/dl


(1.7-2.2)


 


Total Bilirubin


 


 0.3 mg/dl


(0.2-1.3)


 


Aspartate Amino Transf


(AST/SGOT) 


 123 U/L (0-35) 





 


Alanine Aminotransferase


(ALT/SGPT) 


 49 U/L (0-56) 





 


Alkaline Phosphatase  83 U/L (0-126) 


 


Total Protein


 


 8.0 g/dl


(6.3-8.2)


 


Albumin


 


 4.2 g/dl


(3.5-5.0)


 


Salicylates Level  < 10 mg/L 


 


Salicylate Last Dose Date  unk 


 


Acetaminophen Level  < 10 ug/ml 


 


Serum Alcohol  282 mg/dl 








Chemistry








Test


 11/29/18


21:17 11/29/18


21:35


 


Urine Color Yellow  


 


Urine Clarity Clear  


 


Urine pH


 6.0 pH


(4.8-9.5) 





 


Urine Specific Gravity 1.012  


 


Urine Protein


 30 mg/dL


(NEGATIVE) 





 


Urine Glucose (UA)


 Negative mg/dL


(NEGATIVE) 





 


Urine Ketones


 Negative mg/dL


(NEGATIVE) 





 


Urine Blood


 Small


(NEGATIVE) 





 


Urine Nitrite


 Negative


(NEGATIVE) 





 


Urine Bilirubin


 Negative


(NEGATIVE) 





 


Urine Urobilinogen


 Negative mg/dL


(0.2-1.9) 





 


Urine Leukocyte Esterase


 Negative


(NEGATIVE) 





 


Urine RBC


 1 /HPF


(0-2/HPF) 





 


Urine WBC


 1 /HPF


(0-5/HPF) 





 


Urine Squamous Epithelial


Cells None /LPF


(</=FEW) 





 


Urine Bacteria


 Negative /HPF


(NONE-FEW) 





 


Urine Mucus


 Few /HPF


(NONE-FEW) 





 


Urine Opiates Screen Negative  


 


Urine Barbiturates Screen Negative  


 


Ur Tricyclic Antidepressants


Screen Negative 


 





 


Urine Phencyclidine Screen Negative  


 


Urine Amphetamines Screen Negative  


 


Urine Benzodiazepines Screen Negative  


 


Urine Cocaine Screen Negative  


 


Urine Cannabinoids Screen Negative  


 


White Blood Count


 


 7.5 k/uL


(4.5-11.0)


 


Red Blood Count


 


 5.87 M/uL


(4.00-5.60)


 


Hemoglobin


 


 18.1 g/dL


(14.0-18.0)


 


Hematocrit


 


 52.6 %


(42.0-52.0)


 


Mean Corpuscular Volume


 


 89.6 fL


(80.0-96.0)


 


Mean Corpuscular Hemoglobin


 


 30.8 pg


(26.0-33.0)


 


Mean Corpuscular Hemoglobin


Concent 


 34.4 g/dL


(32.0-36.0)


 


Red Cell Distribution Width


 


 13.8 %


(11.5-14.5)


 


Platelet Count


 


 231 K/uL


(150-450)


 


Mean Platelet Volume


 


 7.6 fL


(7.2-11.1)


 


Neutrophils (%) (Auto)


 


 46.2 %


(39.4-72.5)


 


Lymphocytes (%) (Auto)


 


 39.2 %


(17.6-49.6)


 


Monocytes (%) (Auto)


 


 11.8 %


(4.1-12.4)


 


Eosinophils (%) (Auto)


 


 1.4 %


(0.4-6.7)


 


Basophils (%) (Auto)


 


 1.4 %


(0.3-1.4)


 


Nucleated RBC Relative Count


(auto) 


 0.1 /100WBC 





 


Neutrophils # (Auto)


 


 3.5 K/uL


(2.0-7.4)


 


Lymphocytes # (Auto)


 


 3.0 K/uL


(1.3-3.6)


 


Monocytes # (Auto)


 


 0.9 K/uL


(0.3-1.0)


 


Eosinophils # (Auto)


 


 0.1 K/uL


(0.0-0.5)


 


Basophils # (Auto)


 


 0.1 K/uL


(0.0-0.1)


 


Nucleated RBC Absolute Count


(auto) 


 0.00 K/uL 





 


Glomerular Filtration Rate


Calc 


 > 60.0 





 


Calcium Level


 


 8.8 mg/dl


(8.4-10.2)


 


Magnesium Level


 


 1.8 mg/dl


(1.7-2.2)


 


Total Bilirubin


 


 0.3 mg/dl


(0.2-1.3)


 


Aspartate Amino Transf


(AST/SGOT) 


 123 U/L (0-35) 





 


Alanine Aminotransferase


(ALT/SGPT) 


 49 U/L (0-56) 





 


Alkaline Phosphatase  83 U/L (0-126) 


 


Total Protein


 


 8.0 g/dl


(6.3-8.2)


 


Albumin


 


 4.2 g/dl


(3.5-5.0)


 


Salicylates Level  < 10 mg/L 


 


Salicylate Last Dose Date  unk 


 


Acetaminophen Level  < 10 ug/ml 


 


Serum Alcohol  282 mg/dl 








Toxicology








Test


 11/29/18


21:17 11/29/18


21:35


 


Urine Opiates Screen Negative  


 


Urine Barbiturates Screen Negative  


 


Ur Tricyclic Antidepressants


Screen Negative 


 





 


Urine Phencyclidine Screen Negative  


 


Urine Amphetamines Screen Negative  


 


Urine Benzodiazepines Screen Negative  


 


Urine Cocaine Screen Negative  


 


Urine Cannabinoids Screen Negative  


 


Salicylates Level  < 10 mg/L 


 


Salicylate Last Dose Date  unk 


 


Acetaminophen Level  < 10 ug/ml 


 


Serum Alcohol  282 mg/dl 








Urinalysis








Test


 11/29/18


21:17


 


Urine Color Yellow 


 


Urine Clarity Clear 


 


Urine pH


 6.0 pH


(4.8-9.5)


 


Urine Specific Gravity 1.012 


 


Urine Protein


 30 mg/dL


(NEGATIVE)


 


Urine Glucose (UA)


 Negative mg/dL


(NEGATIVE)


 


Urine Ketones


 Negative mg/dL


(NEGATIVE)


 


Urine Blood


 Small


(NEGATIVE)


 


Urine Nitrite


 Negative


(NEGATIVE)


 


Urine Bilirubin


 Negative


(NEGATIVE)


 


Urine Urobilinogen


 Negative mg/dL


(0.2-1.9)


 


Urine Leukocyte Esterase


 Negative


(NEGATIVE)


 


Urine RBC


 1 /HPF


(0-2/HPF)


 


Urine WBC


 1 /HPF


(0-5/HPF)


 


Urine Squamous Epithelial


Cells None /LPF


(</=FEW)


 


Urine Bacteria


 Negative /HPF


(NONE-FEW)


 


Urine Mucus


 Few /HPF


(NONE-FEW)











ED Course/Re-evaluation


ED Course


Labs with the elevated alcohol and the positive cannabinoids but otherwise nega


tive. Discussed with Dr. Lima who admitted the patient to behavioral health


Decision to Disposition Date:  Nov 29, 2018


Decision to Disposition Time:  22:14





Depart


Departure


Latest Vital Signs





Vital Signs








  Date Time  Temp Pulse Resp B/P (MAP) Pulse Ox O2 Delivery O2 Flow Rate FiO2


 


11/29/18 22:15    162/111 (128)    


 


11/29/18 22:13  67   97   


 


11/29/18 21:23 98.7  17   Room Air  








Impression:  


   Primary Impression:  


   Suicidal ideation


   Additional Impression:  


   Alcohol intoxication


Condition:  Condition Unchanged


[facility]  [facility]


New Scripts


No Active Prescriptions or Reported Meds





Problem Qualifiers








   Additional Impression:  


   Alcohol intoxication


   Complication of substance-induced condition:  uncomplicated  Qualified Codes:


    F10.920 - Alcohol use, unspecified with intoxication, uncomplicated








SHEELA LOPEZ MD             Nov 29, 2018 21:16

## 2018-11-29 NOTE — [facility] - PSYCHIATRIC EVALUATION
ER - Title 25 [facility] Evaluation


Title 25 Evaluation


Patient Detained By:  Law Enforcement


Referral Source:  molly [place], patient


Date Patient Detained:  Nov 29, 2018


Time Patient Detained:  20:54


Date group home Expires:  Dec 4, 2018


Time group home Expires:  20:54


Legal Status: Police Hold:  No


Legal Status: Residence:  [facility] Resident


Assessment Data Provided By:  Patient, Law Enforcement


HPI/ROS:  


CHIEF COMPLAINT: Emergency long term for suicidal statements





HISTORY OF PRESENT ILLNESS: This is a 33-year-old male. He is here with


the 's office, brought in under emergency long term for suicidal


statements made earlier. He states that he has been stressed out and was


just blowing off steam when he told a female friend that he wanted to off


himself. They, hence about driving down the road standing up in the open


roof and causing an accident and/or running head-on into another vehicle.


The female friend called behavioral health who then phoned law enforcement


who then made contact with a female friend. They then made contact with


the patient when he arrived at the female friend's home. Patient states


that he is not currently suicidal. He has been drinking tonight. He has


been here in the ER and admitted to behavioral health in the past for


alcohol detox. Denies any other drug use. He has a little bit upset


situation because he feels his words were taken out of context when he was


just blowing off steam.





REVIEW OF SYSTEMS:


Respiratory: No cough, no dyspnea.


Cardiovascular: No chest pain, no palpitations.


Gastrointestinal: No vomiting, no abdominal pain.


Musculoskeletal: No musculoskeletal pain.


Admit due to SI or Attempt:  Yes


Suicide Plan:  No Plan


Alcohol or Drugs Involved:  Yes


Is Patient Info Reliable:  Yes


Is Collateral Info Reliable:  Yes





Mental Status Exam


General Appearance:  Good Eye Contact, Cooperative, Polite, Tearful


Speech:  Clear, Spontaneous, Normal Rate, Normal Rhythm, Normal Volume, Normal 


Tone


Mood:  Dysthmic/Depressed


Affect:  Calm, Sad


Thought Process:  Organized, Logical


Thought Content:  No Suicidal Ideation, No Homicidal Ideation


Sensorium:  Clear


Cognition:  Alert & Oriented-Person, Alert & Oriented-Place, Alert & Oriented-


Time, Alert-Oriented-Situation


Insight Judgment:  Fair





Current Risk & History


Current Dangerous Risk Assessm:  Current Suicide Ideation


Past Dangerous Risk Assessm:  Suicide Ideation-last 6mo


Previous Suicide Attempt:  No Previous Attempt


Previous Psychiatric Illness:  Yes


Previous Psychiatric Treatment:  Yes





Risk Assessment & Disposition


Evaluated Risk Assessment:


Denies current ideation. Says was not serious and just blowing off steam, ho


wever multiple comments earlier and alcohol on board, recommend further 


evaluation by psychiatry.


Impression:  


   Primary Impression:  


   Suicidal ideation


   Additional Impression:  


   Alcohol intoxication


Meets Mental Illness Req.:  Yes


Meets Dangerousness Req.:  Yes


Emergency group home to be:  Upheld


Date of Decision:  Nov 29, 2018


Time of Decision:  22:14


Patient is Medically Stable at:  Yes


Disposition:  [facility]





Problem Qualifiers








   Additional Impression:  


   Alcohol intoxication


   Complication of substance-induced condition:  uncomplicated  Qualified Codes:


    F10.920 - Alcohol use, unspecified with intoxication, uncomplicated








SHEELA LOPEZ MD             Nov 29, 2018 21:31

## 2018-11-30 VITALS — SYSTOLIC BLOOD PRESSURE: 175 MMHG | DIASTOLIC BLOOD PRESSURE: 128 MMHG

## 2018-11-30 VITALS — SYSTOLIC BLOOD PRESSURE: 152 MMHG | DIASTOLIC BLOOD PRESSURE: 102 MMHG

## 2018-11-30 VITALS — SYSTOLIC BLOOD PRESSURE: 155 MMHG | DIASTOLIC BLOOD PRESSURE: 121 MMHG

## 2018-11-30 VITALS — SYSTOLIC BLOOD PRESSURE: 138 MMHG | DIASTOLIC BLOOD PRESSURE: 93 MMHG

## 2018-11-30 VITALS — SYSTOLIC BLOOD PRESSURE: 158 MMHG | DIASTOLIC BLOOD PRESSURE: 110 MMHG

## 2018-11-30 RX ADMIN — FOLIC ACID SCH MG: 1 TABLET ORAL at 08:24

## 2018-11-30 RX ADMIN — Medication SCH MG: at 08:24

## 2018-11-30 RX ADMIN — NICOTINE PRN MG: 4 INHALANT RESPIRATORY (INHALATION) at 08:38

## 2018-11-30 RX ADMIN — NICOTINE PRN MG: 4 INHALANT RESPIRATORY (INHALATION) at 19:58

## 2018-11-30 RX ADMIN — NICOTINE PRN MG: 4 INHALANT RESPIRATORY (INHALATION) at 18:21

## 2018-11-30 RX ADMIN — NICOTINE PRN MG: 4 INHALANT RESPIRATORY (INHALATION) at 11:57

## 2018-11-30 RX ADMIN — DIAZEPAM PRN MG: 10 TABLET ORAL at 12:31

## 2018-11-30 NOTE — SCHAAF H&P
DATE OF ADMISSION:  November 29, 2018



ATTENDING PHYSICIAN

Tuan Liam MD



Patient was seen in the afternoon of 30 November 2018 at approximately 1300 

hours.



PRESENTING PROBLEM/CHIEF COMPLAINT

Patient emergency detained after apparently voicing suicidal ideation as to 

purposely crash car.  Patient intoxicated at the time.



HISTORY OF PRESENT ILLNESS

This is a 33-year-old male who was notably [facility] October 18, 2018, to October 21, 2018, where he was treated for 

alcohol withdrawal.  Patient reports since leaving the facility, patient has 

been drinking less overall and has gone up to a period of a week or two without 

drinking at all.  Patient comes in emergency detained after voicing to a friend 

in an alcohol-induced state the aforementioned suicidal thought.  Patient 

reports he was then "lured" over to a girl's house, who had called the  to 

interview him.  Patient then became emergency detained.  When asked again what 

patient would have done to have police detain him, patient reports, "I don't 

really remember."  Patient admitting to drinking heavily last night, but now 

denying any suicidal thoughts.  Patient reports he wants to stop drinking 

altogether because he is trying to establish a better relationship with his 

daughter.  Specific stressors in his life are his continued ramon with 

alcoholism and that "two females are fighting over me."  Patient denying any 

other symptoms of psychiatric concern.  



MENTAL HEALTH HISTORY

This is the second admission here to [facility], and according to

history, the second admission anywhere to a psychiatric monte.  Reports some 

outpatient treatment related to alcohol use in the past.  Patient is not 

believed to have followed up successfully when he left this last time from our 

unit.  Patient is believed to be going to  and  per his verbalization.  He 

had no suicide attempt history.  



MEDICATIONS

Patient recently prescribed hydroxyzine upon discharge from the unit.  



FAMILY PSYCHIATRIC HISTORY

One of patient's half-brothers may have been involved with alcohol and drug use.

 Mother was involved with alcohol and drug use during her life as well.  No 

other psychiatric history in genetic relatives is known.  No suicides in the 

family.



PAST MEDICAL HISTORY

Patient has been diagnosed with hypertension in the past and been on medications

for it, likely related in part to nicotine and alcohol consumption.  



ALLERGIES

HYDROCODONE, IBUPROFEN, and TRAMADOL.  



SOCIAL HISTORY

Patient was born in [place], raised in the [place] areas.  It is 

unknown if parents were together at time of his birth.  Patient reports living 

with his mother until being placed in a foster home.  He states she was unable 

to care for the multitude of children.  Patient reports having four half-

brothers and two half-sisters.  Patient did graduate from high school.  He 

attempted some college in the past.  Patient states he has two girls fighting 

for his attention at this time.  He wants to be in a relationship with only one 

of them.  Patient considers himself heterosexual.  He does have approximately a 

2-year-old daughter believed to be living with the child's mother.  Patient has 

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 

Reportedly enjoyed that work.  Patient states he is now getting ready to start 

at [employer] in [place].



LEGAL HISTORY

He reports DUI four years ago.  No other legal history is known.



SUBSTANCE ABUSE HISTORY

Patient is drinking alcohol on a daily basis for many years.  Patient reports 

stopping alcohol for up to two weeks since last discharge from our unit.  

Patient reportedly smokes nicotine heavily.  



PHYSICAL EXAMINATION

Please see emergency room note.  

GENERAL:  Intoxicated, 33-year-old male.  No acute medical distress.  

VITAL SIGNS:  At time of admission, temperature 98.7, pulse 79, respiratory rate

17, blood pressure 176/121, pulse oximetry 95% on room air.



LABORATORY DATA

CBC notable for RBCs elevated at 5.87, hemoglobin and hematocrit elevated at 

18.1 and 52.6.  Chemistry panel notable for AST elevated at 123.  TSH 1.41, 

normal range.  Urinalysis did show small urine blood and urine protein present. 

Otherwise unremarkable.  Toxicology screen negative with a serum alcohol level 

of 282 upon admission. 



MENTAL STATUS EXAMINATION 

GENERAL APPEARANCE, BEHAVIOR, AND ATTITUDE:  This is an overall polite, but 

frustrated, 33-year-old male who is stating he does not need to be on the unit. 

Patient accepting of information by this provider given that patient will not be

discharging today.  No bizarre mannerisms or tics.  No periods of tearfulness.  

Some psychomotor agitation, likely associated with alcohol withdrawal.  

SPEECH:  Largely within normal limits.  Regular rate, rhythm, volume, and tone.

MOOD:  Described as frustrated.

AFFECT:  Mildly constricted and mood congruent.  

THOUGHT PROCESSES:  Appear goal directed, fairly logical overall.  No loose 

associations or flight of ideas.  

THOUGHT CONTENT:  Free of auditory or visual hallucinations, ideas of reference,

thought broadcastings, delusions, obsessions, compulsions.  Patient adamantly 

denying suicidal or homicidal ideations.

SENSORIUM:  Clear.

COGNITION:  Alert and oriented to person, place, time, and situation.  

MEMORY:  Immediate, recent, and remote estimated intact.

INTELLIGENCE:  Average based on interview.  

INSIGHT AND JUDGMENT:  Considered grossly intact in the absence of alcohol or 

illicit substance use.  



ASSESSMENT

This is a 33-year-old male who was recently discharged for alcohol withdrawal 

treatment in the last month and a half or so.  At this time, will evaluate for 

alcohol withdrawal by [facility] protocol.  Patient is emergency detained.  Will 

continue to evaluate mood and potential suicidal ideation and thoughts in the 

absence of alcohol intoxication.  



DIAGNOSES PER DIAGNOSTIC AND STATISTICAL MANUAL OF MENTAL DISORDERS, FIFTH 

EDITION 

1.  Alcohol intoxication.

2.  Alcohol use disorder, severe.

3.  Alcohol-induced mood disorder. 

4.  Partner relational problem.

5.  Alcohol withdrawal.

6.  Stressors of alcohol use, employment, social, and financial.  



PLAN

1.  Admit to the unit.

2.  Necessary precautions will be implemented.

3.  Patient will participate in individual and group therapy.

4.  Medications for alcohol withdrawal will include diazepam per [facility] protocol.

5.  Collateral information to be obtained as necessary.

6.  Estimated length of stay three days. 

MTDD

## 2018-12-01 VITALS — DIASTOLIC BLOOD PRESSURE: 111 MMHG | SYSTOLIC BLOOD PRESSURE: 159 MMHG

## 2018-12-01 VITALS — DIASTOLIC BLOOD PRESSURE: 115 MMHG | SYSTOLIC BLOOD PRESSURE: 167 MMHG

## 2018-12-01 RX ADMIN — Medication SCH MG: at 07:31

## 2018-12-01 RX ADMIN — NICOTINE PRN MG: 4 INHALANT RESPIRATORY (INHALATION) at 07:31

## 2018-12-01 RX ADMIN — FOLIC ACID SCH MG: 1 TABLET ORAL at 07:31

## 2018-12-01 NOTE — DISCHARGE SUMMARY
DATE OF ADMISSION:  November 29, 2018

DATE OF DISCHARGE:  December 1, 2018 



ATTENDING PRACTITIONER

Sidra Bolivar, Psychiatric Nurse Practitioner 



Patient was seen on the morning of 12/01/2018.  



FINAL DIAGNOSIS 

Alcohol use disorder, severe.



REASON FOR ADMISSION

This is a 33-year-old male admitted to the unit under an emergency skilled nursing 

after it was reported that he made a suicidal statement to a female friend.  

Patient adamantly denies that he made a suicidal statement, and he adamantly 

denies that he is having any suicidal thoughts to date, and he is voicing plans 

for the future.  



REVIEW OF SYSTEMS

Please see emergency room note for complete review of systems.



PHYSICAL EXAMINATION

VITAL SIGNS:  On the day of discharge include temperature 97, pulse 58, blood 

pressure 159/111, pulse oximetry 94% on room air.  Patient reports a history of 

hypertension and reports that he has a current prescription for antihypertensive

medication that he has not been taking.  



LABORATORY DATA

Completed in the Emergency Room showed red blood cell counts at 5.87 and high, 

hemoglobin at 18.1 and high, hematocrit 52.6 and high.  AST high at 123.  

Alcohol level was 282 on November 29, 2018, at 2135.  Salicylates were negative.

 Acetaminophen level negative.  Drug screen negative.  



MENTAL STATUS EXAMINATION 

GENERAL APPEARANCE, BEHAVIOR, AND ATTITUDE:  This is a 33-year-old male who 

appears his stated age.  Hygiene appears within normal limits.  He is pleasant 

and cooperative.  No periods of tearfulness are noted.  

SPEECH:  Clear and spontaneous.  Normal rate, rhythm, and volume.

MOOD:  Reported as good. 

AFFECT:  Rangeful and appropriate.  

THOUGHT PROCESSES:  Overall logical and goal directed.  No loose associations or

flight of ideas.  

THOUGHT CONTENT:  Patient is adamantly denying any suicidal ideation.  He denies

homicidal ideation.  He is free of any auditory or visual hallucinations.  

Denies ideas of reference, thought broadcastings, delusions, obsessions, or 

compulsions.  

SENSORIUM:  Clear.

COGNITION:  Alert and oriented to person, place, time, and situation.  

MEMORY:  Immediate, recent, and remote estimated grossly intact.   

INTELLIGENCE:  Average based upon interview.  

INSIGHT AND JUDGMENT:  Fair.  He is acknowledging that he needs to follow up 

with an outpatient therapist and reports plan to continue with AA.  



TREATMENT 

Patient was monitored with [facility] protocol.  He participated in individual, group,

and milieu therapy and education.  He participated in the treatment team with 

provider on the morning of discharge.  



HOSPITAL COURSE 

Patient was cooperative throughout stay.  He continued to maintain that he had 

no suicidal thoughts.  Alcohol withdrawal was minimal.  



CONDITION OF PATIENT ON DISCHARGE

Considered stable and a minimal risk to himself and others, appropriate for 

outpatient management.



DISPOSITION 

Patient was discharged to home.  He is to follow up with [facility] to

initiate outpatient therapy.  He should follow up with his primary care provider

related to hypertension.  Patient was discharged with Nicotrol inhalers for 

smoking cessation.  Otherwise, no psychotropic medications.  The 24-hour crisis 

line number was provided should symptoms or problems return.  The risks, 

benefits, and alternatives of the above discharge plan were discussed with 

client.  Again, he is recommended to follow up with AA and NA.  Informed consent

as given to proceed with the above discharge plan by this competent patient.  

MARCELA

## 2019-01-23 ENCOUNTER — HOSPITAL ENCOUNTER (EMERGENCY)
Dept: HOSPITAL 89 - ER | Age: 34
Discharge: HOME | End: 2019-01-23
Payer: SELF-PAY

## 2019-01-23 VITALS — DIASTOLIC BLOOD PRESSURE: 102 MMHG | SYSTOLIC BLOOD PRESSURE: 141 MMHG

## 2019-01-23 DIAGNOSIS — F17.210: ICD-10-CM

## 2019-01-23 DIAGNOSIS — J01.90: Primary | ICD-10-CM

## 2019-01-23 PROCEDURE — 99281 EMR DPT VST MAYX REQ PHY/QHP: CPT

## 2019-01-23 NOTE — ER REPORT
History and Physical


Time Seen By MD:  09:35


Hx. of Stated Complaint:  


HEAD CONGESTION


HPI/ROS


CHIEF COMPLAINT: Sinus pressure congestion





HISTORY OF PRESENT ILLNESS: 33-year-old male comes emergency Department 


complaining of sinus pressure congestion the last 2 or 3 days worse when he 


bends forward primarily localized to the left some palpable percussive 


tenderness retests face said he was exposed he believes some aerosolized product


however subsequently in the last couple days he's had significant sinus pressure


with no fever chills or sweats has a past medical history of alcohol and alcohol


abuse he admits to drinking last evening. Patient has no shortness of breath 


cough fever chills chest pain nausea vomiting or diarrhea or additional 


complaints noted





REVIEW OF SYSTEMS:


Respiratory: No cough, no dyspnea.


Cardiovascular: No chest pain, no palpitations.


Gastrointestinal: No vomiting, no abdominal pain.


Musculoskeletal: No back pain.


Remainder of the 14 system rev:  Yes


Allergies:  


Coded Allergies:  


     ibuprofen (Verified  Allergy, Intermediate, HIVES, 1/23/19)


Home Meds


Reported Medications


Nicotine (NICOTROL) 10 Mg/Inh Ctr, 10 MG INH PRN PRN for NICOTINE REPLACEMENT


   12/1/18


Reviewed Nurses Notes:  Yes


Old Medical Records Reviewed:  Yes


Hx Smoking:  Yes


Smoking Status:  Current: Every Day Smoker


Exposure to Second Hand Smoke?:  Yes (self)


Hx Substance Use Disorder:  Yes (has started using marijuana medicinally because


of a girl he is seeing)


Hx Alcohol Use:  Yes (has been attending AA and is working on it)


Constitutional





Vital Sign - Last 24 Hours








 1/23/19





 09:29


 


Temp 98.1


 


Pulse 83


 


Resp 14


 


B/P (MAP) 141/102


 


Pulse Ox 97


 


O2 Delivery Room Air








Physical Exam


  General Appearance: The patient is alert, has no immediate need for airway 


protection and no current signs of toxicity.  [ ]


Eyes: Pupils equal and round no injection.


Respiratory: Chest is non tender, lungs are clear to auscultation.


Cardiac: regular rate and rhythm [ ]


Gastrointestinal: Abdomen is soft and non tender, no masses, bowel sounds 


normal.


Musculoskeletal:  Neck: Neck is supple and non tender.


   Extremities have full range of motion and are non tender.


Skin: No rashes or lesions.


HEENT examination palpable percussive tenderness in the right maxillary sinuses 


consistent with a probable sinusitis otherwise unremarkable exam


DIFFERENTIAL DIAGNOSIS: After history and physical exam differential diagnosis 


was considered for sinusitis





Medical Decision Making


ED Course/Re-evaluation


ED Course


Medical decision making 33-year-old male history of alcohol abuse comes in with 


classic symptoms of acute sinusitis I will start him on by mouth antibiotics and


primary care follow-up


Decision to Disposition Date:  Jan 23, 2019


Decision to Disposition Time:  09:42





Depart


Departure


Latest Vital Signs





Vital Signs








  Date Time  Temp Pulse Resp B/P (MAP) Pulse Ox O2 Delivery O2 Flow Rate FiO2


 


1/23/19 09:29 98.1 83 14 141/102 97 Room Air  








Impression:  


   Primary Impression:  


   Sinusitis


Condition:  Improved


Disposition:  HOME OR SELF-CARE


Referrals:  


ADDISON MELTON MD


5 Days


New Scripts


 Amoxicillin 500 Mg Tab (AMOXICILLIN 500 MG TAB) 500 Mg Tablet


2 TAB PO Q12H, #56 TAB


   TAKE TWO TABLETS BY MOUTH EVERY 12 HOURS


   Prov: ELYSIA MALONEY MD         1/23/19


Patient Instructions:  Sinusitis (ED)











ELYSIA MALONEY MD           Jan 23, 2019 09:44

## 2019-03-03 ENCOUNTER — HOSPITAL ENCOUNTER (EMERGENCY)
Dept: HOSPITAL 89 - ER | Age: 34
Discharge: HOME | End: 2019-03-03
Payer: SELF-PAY

## 2019-03-03 VITALS — SYSTOLIC BLOOD PRESSURE: 105 MMHG | DIASTOLIC BLOOD PRESSURE: 93 MMHG

## 2019-03-03 DIAGNOSIS — J15.9: Primary | ICD-10-CM

## 2019-03-03 PROCEDURE — 71046 X-RAY EXAM CHEST 2 VIEWS: CPT

## 2019-03-03 PROCEDURE — 85379 FIBRIN DEGRADATION QUANT: CPT

## 2019-03-03 PROCEDURE — 87502 INFLUENZA DNA AMP PROBE: CPT

## 2019-03-03 PROCEDURE — 99284 EMERGENCY DEPT VISIT MOD MDM: CPT

## 2019-03-03 PROCEDURE — 71275 CT ANGIOGRAPHY CHEST: CPT

## 2019-03-03 NOTE — ER REPORT
History and Physical


Time Seen By MD:  15:39


Hx. of Stated Complaint:  


cough for 3 days, right sided lung pain


HPI/ROS


CHIEF COMPLAINT: cough





HISTORY OF PRESENT ILLNESS: PT has been sick for 3 days with runny nose and 


cough.  cough getting worse. Non productive. + pain in right side of chest with 


cough and with deep breath. Pt state he is having trouble sleeping due to the 


cough. no leg pain or swelling. pt has hx of back issues and states the cough is


making his chronic back pain worse as well. + chills





REVIEW OF SYSTEMS:


Constitutional: No fever, + chills.


Eyes: No discharge.


ENT: No sore throat, + runny nose


Cardiovascular: No chest pain, no palpitations.


Respiratory: + cough, + shortness of breath.


Gastrointestinal: No abdominal pain, no vomiting.


Genitourinary: No hematuria.


Musculoskeletal: + back pain.


Skin: No rashes.


Neurological: No headache.


Allergies:  


Coded Allergies:  


     ibuprofen (Verified  Allergy, Intermediate, HIVES, 1/23/19)


Home Meds


Active Scripts


 Amoxicillin 500 Mg Tab (AMOXICILLIN 500 MG TAB) 500 Mg Tablet, 2 TAB PO Q12H, 


#56 TAB


   TAKE TWO TABLETS BY MOUTH EVERY 12 HOURS


   Prov:ELYSIA MALONEY MD         1/23/19


Reported Medications


Nicotine (NICOTROL) 10 Mg/Inh Ctr, 10 MG INH PRN PRN for NICOTINE REPLACEMENT


   12/1/18


Past Medical/Surgical History


Pmhx: htn, back pain, polysubstance abuse, alcohol abuse, anxiety


Reviewed Nurses Notes:  Yes


Hx Smoking:  Yes


Smoking Status:  Current: Every Day Smoker


Exposure to Second Hand Smoke?:  Yes (self)


Hx Substance Use Disorder:  Yes


Hx Alcohol Use:  Yes (has been attending AA and is working on it)


Constitutional





Vital Sign - Last 24 Hours








 3/3/19 3/3/19 3/3/19





 15:21 17:00 17:30


 


Temp 98.0  


 


Pulse 100 77 78


 


Resp 18  


 


B/P (MAP) 149/100  138/99 (112)


 


Pulse Ox 95 90 92


 


O2 Delivery Room Air  








Physical Exam


General Appearance: The patient is alert, has no immediate need for airway 


protection and no signs of toxicity.


Eyes: Pupils equal and round no pallor or injection, EOMI


ENT:  no pharyngeal erythema or exudates, Mucous membranes are moist, TM are nl 


b/l


Respiratory: There are no retractions, lungs are clear to auscultation, + 


decreased breathsounds


Cardiovascular: Regular rate and rhythm. pulses are equal and symmetrical


Gastrointestinal:  Abdomen is soft and non tender, no masses, bowel sounds 


normal, no guarding, no rigidity or rebound


Neurological: Cranial nerves II-XII grossly intact, no sensory or motor loss


Skin: Warm and dry, no rashes.


Musculoskeletal: Neck is supple non tender, no vertebral tenderness


Extremities are nontender, non swollen and have full range of motion.








DIFFERENTIAL DIAGNOSIS: After history and physical exam differential diagnosis 


was considered for bronchitis, pneumonia, PE





Medical Decision Making


Data Points


Laboratory





Hematology








Test


 3/3/19


15:50 3/3/19


16:40


 


Influenza Virus Type A (PCR)


 Negative


(NEGATIVE) 





 


Influenza Virus Type B (PCR)


 Negative


(NEGATIVE) 





 


D-Dimer Quantitative (PE/DVT)


 


 1.79 ug/ml


(0-0.50)








Chemistry








Test


 3/3/19


15:50 3/3/19


16:40


 


Influenza Virus Type A (PCR)


 Negative


(NEGATIVE) 





 


Influenza Virus Type B (PCR)


 Negative


(NEGATIVE) 





 


D-Dimer Quantitative (PE/DVT)


 


 1.79 ug/ml


(0-0.50)








Coagulation








Test


 3/3/19


16:40


 


D-Dimer Quantitative (PE/DVT) 1.79 ug/ml 











EKG/Imaging


Imaging


see reports





ED Course/Re-evaluation


ED Course


check flu and influenza





 03/03/2019 4:37:14 pm Pt still with cough and pain on right lung with the 


cough.  PTs xray does not show pneumonia but there was a question of nipple vs 


nodule.  Will check D-dimer due to pleuritic chest pain.  If positvie will 


require CT and can hold off on repeat cxr. 





 03/03/2019 6:36:53 pm Pt ct shows now pe but does have early pneumonia as well 


as nodules.  PT is a smoker. I spoke to pt at length about the nodules and need 


for follow up.  Pt states he will stop smoking and will get the repeat ct in 


next 3-6 months as suggested.


Decision to Disposition Date:  Mar 3, 2019


Decision to Disposition Time:  18:37





Depart


Departure


Latest Vital Signs





Vital Signs








  Date Time  Temp Pulse Resp B/P (MAP) Pulse Ox O2 Delivery O2 Flow Rate FiO2


 


3/3/19 17:30  78  138/99 (112) 92   


 


3/3/19 15:21 98.0  18   Room Air  








Impression:  


   Primary Impression:  


   Community acquired bacterial pneumonia


Condition:  Improved


Disposition:  HOME OR SELF-CARE


New Scripts


Azithromycin (ZITHROMAX) 250 Mg Tablet


1 TAB PO QDAY, #6 TAB


   2 pills 500mg tomorrow then 1 pill once a day for next 4 days.


   Prov: ANALILIA OROZCO DO         3/3/19


Patient Instructions:  Community Acquired Pneumonia (ED)





Additional Instructions:  


Your cat scan today showed pneumonia.


I sent cough syrup and antibiotics to [facility]'s for you to  tomorrow. 


Your cat scan also showed a nonspecific nodule in your lung.  You need to have a


repeat cat scan in 3-6 months to check on the nodule.  


Stop smoking.











ANALILIA OROZCO DO             Mar 3, 2019 15:39

## 2019-03-03 NOTE — RADIOLOGY IMAGING REPORT
FACILITY: [facility] 

 

PATIENT NAME: Jayant Farmer

: 1985

MR: [id number]

V: [id number]

EXAM DATE: 

ORDERING PHYSICIAN: ANALILIA OROZCO

TECHNOLOGIST: 

 

Location: [facility]

Patient: Jayant Farmer

: 1985

MRN: [id number]

Visit/Account:[id number]

Date of Sevice:  3/03/2019

 

ACCESSION #: [id number]

 

2 VIEWS CHEST

 

INDICATION: Cough and shortness of breath.

 

COMPARISON: 2018.

 

FINDINGS:

Cardiomediastinal silhouette and pulmonary vessels within normal limits.

There is no focal infiltrate or lobar consolidation.

There is no pneumothorax or pleural effusion.

Small nodular opacity in the right lower lobe which is not seen previously. No other nodules.

Upper abdomen is unremarkable. No acute bony abnormality.

 

IMPRESSION:

1. No acute cardiopulmonary process.

2. Small nodular opacity seen in the right lower lobe. This is not seen on the previous exam. Unsure 
if this is due to the nipple or overlapping vasculature. A follow-up bilateral shallow oblique view o
f the PA chest with nipple markers can further evaluate.

 

Report Dictated By: Tanvir Ramires at 3/3/2019 4:14 PM

 

Report E-Signed By: Tanvir Ramires  at 3/3/2019 4:17 PM

 

WSN:[id number]

## 2019-03-03 NOTE — RADIOLOGY IMAGING REPORT
FACILITY: [facility] - [place] 

 

PATIENT NAME: Jayant Farmer

: 1985

MR: [id number]

V: [id number]

EXAM DATE: 

ORDERING PHYSICIAN: ANALILIA OROZCO

TECHNOLOGIST: 

 

Location: [facility] - [place]

Patient: Jayant Farmer

: 1985

MRN: [id number]

Visit/Account:[id number]

Date of Sevice:  3/03/2019

 

ACCESSION #: [id number]

 

CT angiogram chest with contrast

 

Indication: Right-sided pleuritic chest pain. Shortness breath. Cough. History smoking.

 

Comparison: 2018.

 

Technique: Axial CT images are obtained through the chest after administration of 90 mL Isovue 370 IV
 contrast. Reformatted coronal and sagittal images were reviewed as well as coronal MIP images.

 One of the following dose optimization techniques was utilized in the performance of this exam: auto
mated exposure control; adjustment of the mA and/or kV according to the patient's size; or use of an 
iterative reconstruction technique.  Specific details can be referenced in the facility's radiology C
T exam operational policy.

 

 

FINDINGS:

No evidence of filling defect within the pulmonary vasculature to suggest pulmonary embolus.

 

Heart is normal size without pericardial effusion. Aorta shows no aneurysm or dissection. Mediastinum
 and hilar regions show no enlarged lymph nodes or abnormal density.

 

There is patchy interstitial opacities seen in the posterior inferior right lower lobe along the fiss
ure without focal consolidation. There is an adjacent soft tissue nodule measuring 8 x 5 mm which is 
some solid on image #41 and series 5. The anterolateral right middle lobe does show a pleural-based 4
 x 4 mm nodule on image #55. The posterior superior left lower lobe shows a and medial airspace opaci
ty measuring 1.9 x 1.1 cm. There is a couple subcentimeter nodules posterior and inferior to this opa
city seen on image #57 and 54 no other consolidations or discrete nodules. No pleural effusion or pne
umothorax. The airways are clear.

 

Bony structures show no acute fractures or aggressive bony lesions. Chest wall shows no enlarged axil
lorna lymph nodes or masses.

 

Limited views of the upper abdomen are unremarkable.

 

IMPRESSION:

1. No evidence of pulmonary embolus.

2. Patchy airspace opacities along the posterior inferior right lower lobe which could represent epi
y pneumonia. There is a subsolid nodule anterior to this area measuring 8 mm which is nonspecific. Th
ere is also focal airspace opacity seen in the medial aspect of the left lower lobe and the superior 
region with a couple adjacent nodules. There is also a pleural-based right middle lobe nodule. At thi
s point these are nonspecific. The opacities could be secondary to pneumonia. However suggest a follo
w-up exam after medical therapy to evaluate for clearing or other etiologies such as underlying paren
chymal abnormality. The nodules can also be reevaluated at that time. Otherwise suggest follow up for
 nodules per Fleischner guidelines described below.

 

 

 

----------

FLEISCHNER SOCIETY FOLLOW-UP GUIDELINES FOR NEWLY DETECTED INCIDENTAL NODULES IN PERSONS 35 YEARS OF 
AGE OR OLDER.

 

*These recommendations do NOT apply to lung cancer screening, patients with immunosuppression or carmine
ents with a known primary malignancy.

 

MULTIPLE SUBSOLID NODULES

 

If nodule size is < 6 mm:

*  CT at 3-6 months to confirm persistence, then consider CT at 2 and 4 years in selected high risk p
atients.

 

If nodule size is > or equal to 6 mm:

*  CT at 3-6 months to confirm persistence.  Subsequent management based on the most suspicious nodul
e(s).

 

LOW RISK PATIENT: Minimal or absent history of tobacco use and of other known risk factors.

HIGH RISK PATIENT: Tobacco use, family history of lung cancer, upper pulmonary lobe location of nodul
e, presence of emphysema, pulmonary fibrosis, older age.

 

Rolo H, Fanny DP, Arelis HERNANDEZ, et al. Guidelines for Management of Incidental Pulmonary Nodules Dete
cted on CT Images: From the Fleischner Society 2017. Radiology.

 

[facility]

 

 

 

Report Dictated By: Tanvir Ramires at 3/3/2019 5:39 PM

 

Report E-Signed By: Ángel Ramires  at 3/3/2019 5:58 PM

 

WSN:[id number]

## 2019-03-07 ENCOUNTER — HOSPITAL ENCOUNTER (EMERGENCY)
Dept: HOSPITAL 89 - ER | Age: 34
Discharge: HOME | End: 2019-03-07
Payer: SELF-PAY

## 2019-03-07 VITALS — SYSTOLIC BLOOD PRESSURE: 151 MMHG | DIASTOLIC BLOOD PRESSURE: 97 MMHG

## 2019-03-07 DIAGNOSIS — F17.200: ICD-10-CM

## 2019-03-07 DIAGNOSIS — M54.5: Primary | ICD-10-CM
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
## 2019-03-07 PROCEDURE — 72072 X-RAY EXAM THORAC SPINE 3VWS: CPT

## 2019-03-07 PROCEDURE — 96375 TX/PRO/DX INJ NEW DRUG ADDON: CPT

## 2019-03-07 PROCEDURE — 96374 THER/PROPH/DIAG INJ IV PUSH: CPT

## 2019-03-07 PROCEDURE — 99283 EMERGENCY DEPT VISIT LOW MDM: CPT

## 2019-03-07 PROCEDURE — 72100 X-RAY EXAM L-S SPINE 2/3 VWS: CPT

## 2019-03-07 NOTE — RADIOLOGY IMAGING REPORT
FACILITY: [facility] 

 

PATIENT NAME: Jayant Farmer

: 1985

MR: [id number]

V: [id number]

EXAM DATE: 

ORDERING PHYSICIAN: VIVIAN BOYLE

TECHNOLOGIST: 

 

Location: [facility]

Patient: Jayant Farmer

: 1985

MRN: [id number]

Visit/Account:[id number]

Date of Sevice:  3/07/2019

 

ACCESSION #: [id number]

 

EXAMINATION:  Thoracic spine, 3 views

Lumbar spine, 3 views  3/7/2019 9:03 AM

 

HISTORY:  carrying heavy load at work and now with midline pain

 

COMPARISON:  Chest CTA for PE 3/3/2019.  Thoracic spine plain films 2016.  Lumbar spine MRI 
016

 

FINDINGS:

Thoracic spine: Mild levoscoliotic lower thoracic curvature with the apex at T8-9 is similar to previ
ous.  Thoracic vertebral body heights are well-preserved.  No acute bony injury evident.  Pedicles an
d posterior elements are unremarkable.  Negative paraspinous soft tissue contours.

 

Lumbar spine: 5 nonrib-bearing lumbar vertebral levels.  Vertebral body heights are well-preserved.  
Disc height loss at L4-5.  Alignment is normal.  No acute bony injury.

 

IMPRESSION:

1.  Stable slight levoscoliotic lower thoracic curvature.  No acute bony finding in the thoracic spin
e.

2.  No acute bony injury in the lumbar spine.  Mild L4-5 disc height loss without clear progression c
omparing with lumbar MR 2016.

 

Report Dictated By: Skyler Han MD at 3/7/2019 9:56 AM

 

Report E-Signed By: Skyler Han MD  at 3/7/2019 10:01 AM

 

WSN:[id number]

## 2019-03-07 NOTE — ER REPORT
History and Physical


Time Seen By MD:  07:40


Hx. of Stated Complaint:  


PT STATES WAS LIFTING SOMETHING AT WORK, WHEN CO WORKER SLIPPED AND PT CAUGHT 


FULL WT OF CARGO, "TWEAKING " HIS BACK.  gETTING PROGRESSIVELY WORSE OVER THE 


LAST 10 DAYS.  RECENTLY DX WITH PNEUMONIA


HPI/ROS


Was carrying an A/C unit with a co-worker when his co-worker lost his balance, 


and left the pt. carrying the weight of the unit. Has had low back pain since. 


No changes in bowel/bladder. He did not Fall. No neuro deficits


Remainder of the 14 system rev:  Yes


Allergies:  


Coded Allergies:  


     ibuprofen (Verified  Allergy, Intermediate, HIVES, 1/23/19)


Home Meds


Active Scripts


Ibuprofen (IBUPROFEN) 600 Mg Tablet, 1 TAB PO Q6H for 10 Days, #30 TAB


   Prov:VIVIAN BOYLE MD         3/7/19


Cyclobenzaprine Hcl (CYCLOBENZAPRINE HCL) 10 Mg Tablet, 10 MG PO TID, #9 TAB 0 


Refills


   Prov:VIVIAN BOYLE MD         3/7/19


Guaifenesin/Codeine Phosphate (Codeine-Guaifen  mg/5 ml) 120 Ml Liquid, 10


ML PO Q4-6H PRN for COUGH, #120 ML


   Prov:ANALILIA OROZCO DO         3/3/19


Azithromycin (ZITHROMAX) 250 Mg Tablet, 1 TAB PO QDAY, #6 TAB


   2 pills 500mg tomorrow then 1 pill once a day for next 4 days.


   Prov:ANALILIA OROZCO V DO         3/3/19


Reported Medications


[Bp Med]   No Conflict Check


   3/7/19


Discontinued Reported Medications


Nicotine (NICOTROL) 10 Mg/Inh Ctr, 10 MG INH PRN PRN for NICOTINE REPLACEMENT


   12/1/18


Discontinued Scripts


 Amoxicillin 500 Mg Tab (AMOXICILLIN 500 MG TAB) 500 Mg Tablet, 2 TAB PO Q12H, 


#56 TAB


   TAKE TWO TABLETS BY MOUTH EVERY 12 HOURS


   Prov:ELYSIA MALONEY MD         1/23/19


Hx Smoking:  Yes


Smoking Status:  Current: Every Day Smoker


Exposure to Second Hand Smoke?:  Yes (self)


Hx Substance Use Disorder:  Yes


Hx Alcohol Use:  Yes (has been attending AA and is working on it)


Constitutional





Vital Sign - Last 24 Hours








 3/7/19 3/7/19 3/7/19 3/7/19





 07:38 09:51 10:00 10:38


 


Temp 98.3   


 


Pulse 76  52 


 


Resp 20   


 


B/P (MAP) 151/111 154/106 (122) 159/105 (123) 158/106 (123)


 


Pulse Ox 94  91 


 


O2 Delivery Room Air   


 


    





 3/7/19   





 11:00   


 


Pulse 50   


 


B/P (MAP) 151/97 (115)   


 


Pulse Ox 94   








Physical Exam


  General appearance: alert no distress.





Back: Thoracic spine has no spinal or paraspinal tenderness to palpation.


          Lumbar spine has mild midline TTP


Gastroinal: Abdomen is soft, non tender, no masses..


Skin: No lesions and no rashes.


Vascular: Normal capillary refill and pulses to feet.


Neurological:  Motor function: leg strength normal and symmetric for both legs


          Sensory function:  normal for all leg dermatomes.


          Straight leg raise negative to 70 degrees.


          Reflexes normal bilaterally on legs.





DIFFERENTIAL DIAGNOSIS: After history and physical exam differential diagnosis 


was considered for back pain including muscular strain, herniated disc, intra-


abdominal and renal causes.





Medical Decision Making


ED Course/Re-evaluation


ED Course


Normal neuro exam. No abdominal pain, hematuria, IVDA, no fever/chills. xrays 


show no emergenct/acute findings. No need for further imaging. Improved with ED 


treatment. Place on light duty. If symptoms continue will follow up with PBJ or 


PCM and will likely need MRI


Decision to Disposition Date:  Mar 7, 2019


Decision to Disposition Time:  11:44





Depart


Departure


Latest Vital Signs





Vital Signs








  Date Time  Temp Pulse Resp B/P (MAP) Pulse Ox O2 Delivery O2 Flow Rate FiO2


 


3/7/19 11:00  50  151/97 (115) 94   


 


3/7/19 07:38 98.3  20   Room Air  








Impression:  


   Primary Impression:  


   Low back pain


Condition:  Improved


Disposition:  HOME OR SELF-CARE


Referrals:  


MANDI WALL MD


New Scripts


Ibuprofen (IBUPROFEN) 600 Mg Tablet


1 TAB PO Q6H for 10 Days, #30 TAB


   Prov: VIVIAN BOYLE MD         3/7/19 


Cyclobenzaprine Hcl (CYCLOBENZAPRINE HCL) 10 Mg Tablet


10 MG PO TID, #9 TAB 0 Refills


   Prov: VIVIAN BOYLE MD         3/7/19


Patient Instructions:  Acute Low Back Pain (ED)





Problem Qualifiers








   Primary Impression:  


   Low back pain


   Chronicity:  acute  Back pain laterality:  midline  Sciatica presence:  


   without sciatica  Qualified Codes:  M54.5 - Low back pain








VIVIAN BOYLE MD                  Mar 7, 2019 07:47

## 2019-03-07 NOTE — RADIOLOGY IMAGING REPORT
FACILITY: [facility] 

 

PATIENT NAME: Jayant Farmer

: 1985

MR: [id number]

V: [id number]

EXAM DATE: 

ORDERING PHYSICIAN: VIVIAN BOYLE

TECHNOLOGIST: 

 

Location: [facility] [place]

Patient: Jayant Farmer

: 1985

MRN: [id number]

Visit/Account:[id number]

Date of Sevice:  3/07/2019

 

ACCESSION #: [id number]

 

EXAMINATION:  Thoracic spine, 3 views

Lumbar spine, 3 views  3/7/2019 9:03 AM

 

HISTORY:  carrying heavy load at work and now with midline pain

 

COMPARISON:  Chest CTA for PE 3/3/2019.  Thoracic spine plain films 2016.  Lumbar spine MRI 
016

 

FINDINGS:

Thoracic spine: Mild levoscoliotic lower thoracic curvature with the apex at T8-9 is similar to previ
ous.  Thoracic vertebral body heights are well-preserved.  No acute bony injury evident.  Pedicles an
d posterior elements are unremarkable.  Negative paraspinous soft tissue contours.

 

Lumbar spine: 5 nonrib-bearing lumbar vertebral levels.  Vertebral body heights are well-preserved.  
Disc height loss at L4-5.  Alignment is normal.  No acute bony injury.

 

IMPRESSION:

1.  Stable slight levoscoliotic lower thoracic curvature.  No acute bony finding in the thoracic spin
e.

2.  No acute bony injury in the lumbar spine.  Mild L4-5 disc height loss without clear progression c
omparing with lumbar MR 2016.

 

Report Dictated By: Skyler Han MD at 3/7/2019 9:56 AM

 

Report E-Signed By: Skyler Han MD  at 3/7/2019 10:01 AM

 

WSN:[id number]

## 2019-06-10 NOTE — NUR
LEFT MESSAGE WITH PB&J STATING PATIENT MAY NEED TO BE ADMITTED AFTER THE PROCEDURE FOR 
ALCOHOL WITHDRAWAL.

## 2019-06-11 ENCOUNTER — HOSPITAL ENCOUNTER (OUTPATIENT)
Dept: HOSPITAL 89 - OR | Age: 34
Discharge: HOME | End: 2019-06-11
Attending: ORTHOPAEDIC SURGERY
Payer: COMMERCIAL

## 2019-06-11 VITALS — SYSTOLIC BLOOD PRESSURE: 133 MMHG | DIASTOLIC BLOOD PRESSURE: 93 MMHG

## 2019-06-11 VITALS — SYSTOLIC BLOOD PRESSURE: 139 MMHG | DIASTOLIC BLOOD PRESSURE: 105 MMHG

## 2019-06-11 VITALS — SYSTOLIC BLOOD PRESSURE: 142 MMHG | DIASTOLIC BLOOD PRESSURE: 99 MMHG

## 2019-06-11 VITALS — SYSTOLIC BLOOD PRESSURE: 144 MMHG | DIASTOLIC BLOOD PRESSURE: 92 MMHG

## 2019-06-11 VITALS — HEIGHT: 75 IN | WEIGHT: 190 LBS | BODY MASS INDEX: 23.62 KG/M2

## 2019-06-11 VITALS — DIASTOLIC BLOOD PRESSURE: 89 MMHG | SYSTOLIC BLOOD PRESSURE: 145 MMHG

## 2019-06-11 VITALS — DIASTOLIC BLOOD PRESSURE: 103 MMHG | SYSTOLIC BLOOD PRESSURE: 150 MMHG

## 2019-06-11 VITALS — DIASTOLIC BLOOD PRESSURE: 114 MMHG | SYSTOLIC BLOOD PRESSURE: 148 MMHG

## 2019-06-11 DIAGNOSIS — S64.496A: Primary | ICD-10-CM

## 2019-06-11 PROCEDURE — 64834 REPAIR OF HAND OR FOOT NERVE: CPT

## 2019-06-11 PROCEDURE — 80305 DRUG TEST PRSMV DIR OPT OBS: CPT

## 2019-06-11 PROCEDURE — 80320 DRUG SCREEN QUANTALCOHOLS: CPT

## 2019-06-11 PROCEDURE — 36415 COLL VENOUS BLD VENIPUNCTURE: CPT

## 2019-06-11 NOTE — OPERATIVE REPORT 1
EVENT DATE: June 11, 2019 

SURGEON: Jerald Yoder MD 

ANESTHESIOLOGIST: Irvin Corley MD 

ANESTHESIA: General, LMA 

ASSISTANT: [place] PA-C





PREOPERATIVE DIAGNOSIS  

Right fifth finger chronic nerve laceration. 



POSTOPERATIVE DIAGNOSIS 

Right fifth finger chronic nerve laceration. 



PROCEDURE PERFORMED 

1.  Right fifth finger chronic nerve laceration direct microscopic nerve repair 

of the nerve with removal of scar tissue.  

2.  This procedure deserves a modifier 22 secondarily due to the fact that it 

took time and a half the surgeon skill and effort, secondarily due to the fact 

of the chronicity of the nerve laxity since it had been cut for months, in order

to dissect through all of the tissue and to get it back repaired.  



FINDINGS

The patient had a cut nerve with the two ends that were scarred into the area 

and a massive amount of scar tissue in the PIP joint.  



ESTIMATED BLOOD LOSS 

Minimal. 



DRAINS

None. 



SPECIMENS 

None. 



COMPLICATIONS 

None. 



TOURNIQUET TIME 

About 45 minutes but we did let it down near the end of the case in order to 

make sure there was no arterial bleeder.  



IMPLANTS USED 

8-0 Nylon suture to repair the nerve with 4 knots total.  



INDICATIONS AND HISTORY 

This patient is a 33-year-old male that presented to my clinic for evaluation 

for right fifth finger digital nerve laceration.  He had cut it almost near a 

year ago where he had had a laceration associated with it and he could not feel 

it, but it started to give him more pain and irritation an he had formed a small

neuroma in this area.  He saw Dr. Muir initially and then was referred to me. 

I told him there was no guarantee that we could make it better, but that I could

definitely go in and cut the ends of the nerve and try and put them back 

together in order to try and do a direct nerve repair sometimes with a neural 

conduit.  We went over the risk and benefits and I told him once again, he may 

still have problems and issues associated with it and he may not get sensation 

back, but he said he was willing to try that.  



DESCRIPTION OF PROCEDURE 

As the patient was brought into the operating room, he and the procedure were 

both verified.  He was placed supine on the operative table and induced and 

intubated by Anesthesia.  The right upper extremity was then prepped and draped 

in the usual fashion and a timeout was observed verifying the correct patient 

and procedure.  



I made a Brunner's type incision over the PIP joint with the point apex towards 

the ulnar side.  Once I was able to go through the skin and subcutaneous tissue,

I was then able to dissect some of the scar tissue.  There was a significant 

amount of scar tissue around the nerves themselves.  I was able to identify the 

nerves proximally and distally, but then after careful dissection which was very

tedious in order to get over the entire aspects of the nerve, I was then able to

identify the 2 never ends that were cut and then identified the neuromas that 

had formed on the ends of each side.  Once I was able to do this, I was then 

able to remove those and then utilizing loops with a microscopic technique, I 

was able to then use an 8-0 Nylon to repair the digital nerve in the area.  We 

put four 8-0 Nylon sutures through the nerve itself in order to repair it with a

good end-to-end repair with no undo tension.  Once I was able to do this, I was 

then able to irrigate with copious amounts of saline.  We let the tourniquet 

down and then I was able to irrigate with saline once again.  There was just 

some small vessel bleeders, but no signs of arterial bleeding and so therefore 

we then put the tourniquet back up, dried out the wound and then closed the skin

with a 4-0 Nylon interrupted mattress fashion at the corner of the Brunner's 

incision and then simple sutures through the middle portion.  I was then able to

flex and extend the finger.  There were no signs of problems or issues 

associated with this and then we dressed with Xeroform gauze 4 x 4s and an ulnar

gutter splint.  The tourniquet was let down after about 45 minutes and the 

patient was awakened, extubated and transferred to the PACU under stable 

condition.  



MARCELA

## 2022-06-27 NOTE — ER REPORT
History and Physical


Time Seen By MD:  15:56


HPI/ROS


CHIEF COMPLAINT: Alcohol detox





HISTORY OF PRESENT ILLNESS: 33-year-old male patient presents to emergency room 


with complaint of needing alcohol detox. Patient states he has a long-standing 


history of alcohol abuse and does drink a significant amount, totaling over a 


fifth of hard liquor a day. Patient states that he does often drink 2 40s of 


beer. Patient states that he has not tried alcohol detox previously. He states 


he would like to do that. Patient did present with a friend and that was their 


plan to detox together. Patient states that they were here in the emergency room


last night, and were turned away because there is not any room. He states he did


drink a 40 last night as well as had a shot upon arrival in the emergency room. 


Patient states he has noticed some shakiness.





REVIEW OF SYSTEMS:


Respiratory: No cough, no dyspnea.


Cardiovascular: No chest pain, no palpitations.


Gastrointestinal: No vomiting, no abdominal pain.


Musculoskeletal: No back pain.


Allergies:  


Coded Allergies:  


     ibuprofen (Verified  Allergy, Intermediate, HIVES, 10/18/18)


     hydrocodone (Verified  Adverse Reaction, Unknown, NAUSEA/VOMITING, 


10/18/18)


     tramadol (Verified  Adverse Reaction, Unknown, NAUSEA/VOMITING, 10/18/18)


Home Meds


No Active Prescriptions or Reported Meds


Reviewed Nurses Notes:  Yes


Hx Smoking:  Yes


Smoking Status:  Current: Every Day Smoker


Exposure to Second Hand Smoke?:  No


Hx Substance Use Disorder:  No (MARIJUANNA, AND ECSTASY IN THE PAST)


Hx Alcohol Use:  Yes (Q4D 40 OZ AND 2 SHOTS)


Constitutional





Vital Sign - Last 24 Hours








 10/18/18 10/18/18 10/18/18 10/18/18





 15:53 15:58 16:08 16:23


 


Temp  99.0  


 


Pulse 126 112 108 98


 


Resp  16  


 


B/P (MAP) 194/118 (143) 194/118  


 


Pulse Ox 93 95 95 95


 


O2 Delivery  Room Air  


 


    





 10/18/18 10/18/18 10/18/18 10/18/18





 16:38 16:53 17:08 17:23


 


Pulse 95 96 96 93


 


Pulse Ox 95 96 96 94





 10/18/18 10/18/18 10/18/18 10/18/18





 17:28 17:43 17:58 18:13


 


Pulse 89 96 99 91


 


B/P (MAP)    146/94 (111)


 


Pulse Ox 96 94 94 93








Physical Exam


  General Appearance: The patient is alert, has no immediate need for airway 


protection and no current signs of toxicity.


Respiratory: Chest is non tender, lungs are clear to auscultation.


Cardiac: regular rate and rhythm


Gastrointestinal: Abdomen is soft and non tender, no masses, bowel sounds 


normal.


Musculoskeletal:  Neck: Neck is supple and non tender.


   Extremities have full range of motion and are non tender.


Skin: No rashes or lesions.





DIFFERENTIAL DIAGNOSIS: After history and physical exam differential diagnosis w


as considered for depression, anxiety, alcohol abuse.





Medical Decision Making


Data Points


Result Diagram:  


10/18/18 1650                                                                   


            10/18/18 1650





Laboratory





Hematology








Test


 10/18/18


15:52 10/18/18


16:50


 


Urine Color Yellow  


 


Urine Clarity Clear  


 


Urine pH


 6.0 pH


(4.8-9.5) 





 


Urine Specific Gravity 1.011  


 


Urine Protein


 30 mg/dL


(NEGATIVE) 





 


Urine Glucose (UA)


 Negative mg/dL


(NEGATIVE) 





 


Urine Ketones


 Negative mg/dL


(NEGATIVE) 





 


Urine Blood


 Negative


(NEGATIVE) 





 


Urine Nitrite


 Negative


(NEGATIVE) 





 


Urine Bilirubin


 Negative


(NEGATIVE) 





 


Urine Urobilinogen


 4.0 mg/dL


(0.2-1.9) 





 


Urine Leukocyte Esterase


 Negative


(NEGATIVE) 





 


Urine RBC


 None /HPF


(0-2/HPF) 





 


Urine WBC


 <1 /HPF


(0-5/HPF) 





 


Urine Squamous Epithelial


Cells None /LPF


(</=FEW) 





 


Urine Bacteria


 Few /HPF


(NONE-FEW) 





 


Urine Mucus


 Few /HPF


(NONE-FEW) 





 


Urine Opiates Screen Negative  


 


Urine Barbiturates Screen Negative  


 


Ur Tricyclic Antidepressants


Screen Negative 


 





 


Urine Phencyclidine Screen Negative  


 


Urine Amphetamines Screen Negative  


 


Urine Benzodiazepines Screen Negative  


 


Urine Cocaine Screen Negative  


 


Urine Cannabinoids Screen Negative  


 


Red Blood Count


 


 5.61 M/uL


(4.00-5.60)


 


Mean Corpuscular Volume


 


 90.8 fL


(80.0-96.0)


 


Mean Corpuscular Hemoglobin


 


 31.2 pg


(26.0-33.0)


 


Mean Corpuscular Hemoglobin


Concent 


 34.3 g/dL


(32.0-36.0)


 


Red Cell Distribution Width


 


 13.4 %


(11.5-14.5)


 


Mean Platelet Volume


 


 8.1 fL


(7.2-11.1)


 


Neutrophils (%) (Auto)


 


 56.1 %


(39.4-72.5)


 


Lymphocytes (%) (Auto)


 


 29.1 %


(17.6-49.6)


 


Monocytes (%) (Auto)


 


 13.1 %


(4.1-12.4)


 


Eosinophils (%) (Auto)


 


 0.8 %


(0.4-6.7)


 


Basophils (%) (Auto)


 


 0.9 %


(0.3-1.4)


 


Nucleated RBC Relative Count


(auto) 


 0.2 /100WBC 





 


Neutrophils # (Auto)


 


 2.5 K/uL


(2.0-7.4)


 


Lymphocytes # (Auto)


 


 1.3 K/uL


(1.3-3.6)


 


Monocytes # (Auto)


 


 0.6 K/uL


(0.3-1.0)


 


Eosinophils # (Auto)


 


 0.0 K/uL


(0.0-0.5)


 


Basophils # (Auto)


 


 0.0 K/uL


(0.0-0.1)


 


Nucleated RBC Absolute Count


(auto) 


 0.01 K/uL 





 


Peripheral Blood Smear  Yes Y/N 


 


Sodium Level


 


 140 mmol/L


(137-145)


 


Potassium Level


 


 3.1 mmol/L


(3.5-5.0)


 


Chloride Level


 


 101 mmol/L


()


 


Carbon Dioxide Level


 


 23 mmol/L


(22-30)


 


Blood Urea Nitrogen  6 mg/dl (9-21) 


 


Creatinine


 


 0.70 mg/dl


(0.66-1.25)


 


Glomerular Filtration Rate


Calc 


 > 60.0 





 


Random Glucose


 


 107 mg/dl


()


 


Calcium Level


 


 9.3 mg/dl


(8.4-10.2)


 


Magnesium Level


 


 1.6 mg/dl


(1.7-2.2)


 


Total Bilirubin


 


 1.0 mg/dl


(0.2-1.3)


 


Aspartate Amino Transf


(AST/SGOT) 


 133 U/L (0-35) 





 


Alanine Aminotransferase


(ALT/SGPT) 


 88 U/L (0-56) 





 


Alkaline Phosphatase  94 U/L (0-126) 


 


Total Protein


 


 8.0 g/dl


(6.3-8.2)


 


Albumin


 


 4.6 g/dl


(3.5-5.0)


 


Salicylates Level  < 10 mg/L 


 


Salicylate Last Dose Date  unk 


 


Acetaminophen Level  < 10 ug/ml 


 


Serum Alcohol  172 mg/dl 








Chemistry








Test


 10/18/18


15:52 10/18/18


16:50


 


Urine Color Yellow  


 


Urine Clarity Clear  


 


Urine pH


 6.0 pH


(4.8-9.5) 





 


Urine Specific Gravity 1.011  


 


Urine Protein


 30 mg/dL


(NEGATIVE) 





 


Urine Glucose (UA)


 Negative mg/dL


(NEGATIVE) 





 


Urine Ketones


 Negative mg/dL


(NEGATIVE) 





 


Urine Blood


 Negative


(NEGATIVE) 





 


Urine Nitrite


 Negative


(NEGATIVE) 





 


Urine Bilirubin


 Negative


(NEGATIVE) 





 


Urine Urobilinogen


 4.0 mg/dL


(0.2-1.9) 





 


Urine Leukocyte Esterase


 Negative


(NEGATIVE) 





 


Urine RBC


 None /HPF


(0-2/HPF) 





 


Urine WBC


 <1 /HPF


(0-5/HPF) 





 


Urine Squamous Epithelial


Cells None /LPF


(</=FEW) 





 


Urine Bacteria


 Few /HPF


(NONE-FEW) 





 


Urine Mucus


 Few /HPF


(NONE-FEW) 





 


Urine Opiates Screen Negative  


 


Urine Barbiturates Screen Negative  


 


Ur Tricyclic Antidepressants


Screen Negative 


 





 


Urine Phencyclidine Screen Negative  


 


Urine Amphetamines Screen Negative  


 


Urine Benzodiazepines Screen Negative  


 


Urine Cocaine Screen Negative  


 


Urine Cannabinoids Screen Negative  


 


White Blood Count


 


 4.5 k/uL


(4.5-11.0)


 


Red Blood Count


 


 5.61 M/uL


(4.00-5.60)


 


Hemoglobin


 


 17.5 g/dL


(14.0-18.0)


 


Hematocrit


 


 50.9 %


(42.0-52.0)


 


Mean Corpuscular Volume


 


 90.8 fL


(80.0-96.0)


 


Mean Corpuscular Hemoglobin


 


 31.2 pg


(26.0-33.0)


 


Mean Corpuscular Hemoglobin


Concent 


 34.3 g/dL


(32.0-36.0)


 


Red Cell Distribution Width


 


 13.4 %


(11.5-14.5)


 


Platelet Count


 


 186 K/uL


(150-450)


 


Mean Platelet Volume


 


 8.1 fL


(7.2-11.1)


 


Neutrophils (%) (Auto)


 


 56.1 %


(39.4-72.5)


 


Lymphocytes (%) (Auto)


 


 29.1 %


(17.6-49.6)


 


Monocytes (%) (Auto)


 


 13.1 %


(4.1-12.4)


 


Eosinophils (%) (Auto)


 


 0.8 %


(0.4-6.7)


 


Basophils (%) (Auto)


 


 0.9 %


(0.3-1.4)


 


Nucleated RBC Relative Count


(auto) 


 0.2 /100WBC 





 


Neutrophils # (Auto)


 


 2.5 K/uL


(2.0-7.4)


 


Lymphocytes # (Auto)


 


 1.3 K/uL


(1.3-3.6)


 


Monocytes # (Auto)


 


 0.6 K/uL


(0.3-1.0)


 


Eosinophils # (Auto)


 


 0.0 K/uL


(0.0-0.5)


 


Basophils # (Auto)


 


 0.0 K/uL


(0.0-0.1)


 


Nucleated RBC Absolute Count


(auto) 


 0.01 K/uL 





 


Peripheral Blood Smear  Yes Y/N 


 


Glomerular Filtration Rate


Calc 


 > 60.0 





 


Calcium Level


 


 9.3 mg/dl


(8.4-10.2)


 


Magnesium Level


 


 1.6 mg/dl


(1.7-2.2)


 


Total Bilirubin


 


 1.0 mg/dl


(0.2-1.3)


 


Aspartate Amino Transf


(AST/SGOT) 


 133 U/L (0-35) 





 


Alanine Aminotransferase


(ALT/SGPT) 


 88 U/L (0-56) 





 


Alkaline Phosphatase  94 U/L (0-126) 


 


Total Protein


 


 8.0 g/dl


(6.3-8.2)


 


Albumin


 


 4.6 g/dl


(3.5-5.0)


 


Salicylates Level  < 10 mg/L 


 


Salicylate Last Dose Date  unk 


 


Acetaminophen Level  < 10 ug/ml 


 


Serum Alcohol  172 mg/dl 








Toxicology








Test


 10/18/18


15:52 10/18/18


16:50


 


Urine Opiates Screen Negative  


 


Urine Barbiturates Screen Negative  


 


Ur Tricyclic Antidepressants


Screen Negative 


 





 


Urine Phencyclidine Screen Negative  


 


Urine Amphetamines Screen Negative  


 


Urine Benzodiazepines Screen Negative  


 


Urine Cocaine Screen Negative  


 


Urine Cannabinoids Screen Negative  


 


Salicylates Level  < 10 mg/L 


 


Salicylate Last Dose Date  unk 


 


Acetaminophen Level  < 10 ug/ml 


 


Serum Alcohol  172 mg/dl 








Urinalysis








Test


 10/18/18


15:52


 


Urine Color Yellow 


 


Urine Clarity Clear 


 


Urine pH


 6.0 pH


(4.8-9.5)


 


Urine Specific Gravity 1.011 


 


Urine Protein


 30 mg/dL


(NEGATIVE)


 


Urine Glucose (UA)


 Negative mg/dL


(NEGATIVE)


 


Urine Ketones


 Negative mg/dL


(NEGATIVE)


 


Urine Blood


 Negative


(NEGATIVE)


 


Urine Nitrite


 Negative


(NEGATIVE)


 


Urine Bilirubin


 Negative


(NEGATIVE)


 


Urine Urobilinogen


 4.0 mg/dL


(0.2-1.9)


 


Urine Leukocyte Esterase


 Negative


(NEGATIVE)


 


Urine RBC


 None /HPF


(0-2/HPF)


 


Urine WBC


 <1 /HPF


(0-5/HPF)


 


Urine Squamous Epithelial


Cells None /LPF


(</=FEW)


 


Urine Bacteria


 Few /HPF


(NONE-FEW)


 


Urine Mucus


 Few /HPF


(NONE-FEW)











ED Course/Re-evaluation


ED Course


Patient is admitted to exam room, history and physical were obtained. 


Differential diagnoses were considered. On examination lungs are clear, heart is


regular, abdomen soft nontender. The lab work for a behavioral [facility] admission 


were done. Lab results were unremarkable. Patient did sign in voluntarily. I 


discussed the case with Dr. Lima, psychiatrist, who agreed to accept the 


patient for admission. Patient will be admitted to behavioral health for alcohol


detox.


Decision to Disposition Date:  Oct 18, 2018


Decision to Disposition Time:  17:42





Depart


Departure


Latest Vital Signs





Vital Signs








  Date Time  Temp Pulse Resp B/P (MAP) Pulse Ox O2 Delivery O2 Flow Rate FiO2


 


10/18/18 18:13  91  146/94 (111) 93   


 


10/18/18 15:58 99.0  16   Room Air  








Impression:  


   Primary Impression:  


   Alcohol withdrawal


Condition:  Improved


[facility]


New Scripts


No Active Prescriptions or Reported Meds





Problem Qualifiers








   Primary Impression:  


   Alcohol withdrawal


   Complication of substance-induced condition:  uncomplicated  Qualified Codes:


    F10.230 - Alcohol dependence with withdrawal, uncomplicated








RACHEL EDWARD                Oct 18, 2018 15:56 [address]  coding opportunities       Chart reviewed, no opportunity found: CHART REVIEWED, NO OPPORTUNITY FOUND        Patients Insurance     Medicare Insurance: Medicare